# Patient Record
Sex: FEMALE | Race: WHITE | Employment: OTHER | URBAN - METROPOLITAN AREA
[De-identification: names, ages, dates, MRNs, and addresses within clinical notes are randomized per-mention and may not be internally consistent; named-entity substitution may affect disease eponyms.]

---

## 2017-10-10 PROBLEM — E78.00 HIGH CHOLESTEROL: Status: ACTIVE | Noted: 2017-10-10

## 2017-10-10 PROBLEM — E03.9 ACQUIRED HYPOTHYROIDISM: Status: ACTIVE | Noted: 2017-10-10

## 2017-10-10 PROBLEM — J44.9 CHRONIC OBSTRUCTIVE PULMONARY DISEASE, UNSPECIFIED COPD TYPE (HCC): Status: ACTIVE | Noted: 2017-10-10

## 2017-10-10 PROBLEM — I10 ESSENTIAL HYPERTENSION: Status: ACTIVE | Noted: 2017-10-10

## 2017-11-20 ENCOUNTER — LAB ENCOUNTER (OUTPATIENT)
Dept: LAB | Facility: HOSPITAL | Age: 67
End: 2017-11-20
Attending: INTERNAL MEDICINE
Payer: MEDICARE

## 2017-11-20 DIAGNOSIS — E03.9 ACQUIRED HYPOTHYROIDISM: ICD-10-CM

## 2017-11-20 DIAGNOSIS — I10 ESSENTIAL HYPERTENSION: ICD-10-CM

## 2017-11-20 DIAGNOSIS — E78.00 HIGH CHOLESTEROL: ICD-10-CM

## 2017-11-20 DIAGNOSIS — J44.9 CHRONIC OBSTRUCTIVE PULMONARY DISEASE, UNSPECIFIED COPD TYPE (HCC): ICD-10-CM

## 2017-11-20 PROCEDURE — 80076 HEPATIC FUNCTION PANEL: CPT

## 2017-11-20 PROCEDURE — 85025 COMPLETE CBC W/AUTO DIFF WBC: CPT

## 2017-11-20 PROCEDURE — 84443 ASSAY THYROID STIM HORMONE: CPT

## 2017-11-20 PROCEDURE — 36415 COLL VENOUS BLD VENIPUNCTURE: CPT

## 2017-11-20 PROCEDURE — 80061 LIPID PANEL: CPT

## 2017-11-20 PROCEDURE — 80069 RENAL FUNCTION PANEL: CPT

## 2018-06-13 ENCOUNTER — HOSPITAL ENCOUNTER (OUTPATIENT)
Dept: GENERAL RADIOLOGY | Age: 68
Discharge: HOME | End: 2018-06-13
Attending: ORTHOPAEDIC SURGERY
Payer: MEDICARE

## 2018-06-13 DIAGNOSIS — M25.552 LEFT HIP PAIN: ICD-10-CM

## 2018-06-13 PROCEDURE — 73502 X-RAY EXAM HIP UNI 2-3 VIEWS: CPT | Performed by: ORTHOPAEDIC SURGERY

## 2018-07-12 ENCOUNTER — HOSPITAL ENCOUNTER (OUTPATIENT)
Dept: GENERAL RADIOLOGY | Facility: HOSPITAL | Age: 68
Discharge: HOME | End: 2018-07-12
Attending: INTERNAL MEDICINE
Payer: MEDICARE

## 2018-07-12 ENCOUNTER — LAB ENCOUNTER (OUTPATIENT)
Dept: LAB | Facility: HOSPITAL | Age: 68
End: 2018-07-12
Attending: INTERNAL MEDICINE
Payer: MEDICARE

## 2018-07-12 DIAGNOSIS — J44.9 CHRONIC OBSTRUCTIVE PULMONARY DISEASE (COPD) (HCC): ICD-10-CM

## 2018-07-12 DIAGNOSIS — E78.00 HIGH CHOLESTEROL: ICD-10-CM

## 2018-07-12 DIAGNOSIS — J44.9 CHRONIC OBSTRUCTIVE PULMONARY DISEASE, UNSPECIFIED COPD TYPE (HCC): ICD-10-CM

## 2018-07-12 DIAGNOSIS — I10 ESSENTIAL HYPERTENSION: ICD-10-CM

## 2018-07-12 DIAGNOSIS — E03.9 ACQUIRED HYPOTHYROIDISM: ICD-10-CM

## 2018-07-12 LAB
ALBUMIN SERPL BCP-MCNC: 4.2 G/DL (ref 3.5–4.8)
ALBUMIN SERPL BCP-MCNC: 4.2 G/DL (ref 3.5–4.8)
ALP SERPL-CCNC: 54 U/L (ref 32–100)
ALT SERPL-CCNC: 23 U/L (ref 14–54)
ANION GAP SERPL CALC-SCNC: 10 MMOL/L (ref 0–18)
AST SERPL-CCNC: 24 U/L (ref 15–41)
BASOPHILS # BLD: 0 K/UL (ref 0–0.2)
BASOPHILS NFR BLD: 1 %
BILIRUB DIRECT SERPL-MCNC: 0.1 MG/DL (ref 0–0.2)
BILIRUB SERPL-MCNC: 1 MG/DL (ref 0.3–1.2)
BUN SERPL-MCNC: 18 MG/DL (ref 8–20)
BUN/CREAT SERPL: 19.1 (ref 10–20)
CALCIUM SERPL-MCNC: 9.5 MG/DL (ref 8.5–10.5)
CHLORIDE SERPL-SCNC: 101 MMOL/L (ref 95–110)
CHOLEST SERPL-MCNC: 190 MG/DL (ref 110–200)
CO2 SERPL-SCNC: 27 MMOL/L (ref 22–32)
CREAT SERPL-MCNC: 0.94 MG/DL (ref 0.5–1.5)
EOSINOPHIL # BLD: 0.1 K/UL (ref 0–0.7)
EOSINOPHIL NFR BLD: 1 %
ERYTHROCYTE [DISTWIDTH] IN BLOOD BY AUTOMATED COUNT: 13.2 % (ref 11–15)
GLUCOSE SERPL-MCNC: 101 MG/DL (ref 70–99)
HCT VFR BLD AUTO: 47 % (ref 35–48)
HDLC SERPL-MCNC: 48 MG/DL
HGB BLD-MCNC: 15.8 G/DL (ref 12–16)
LDLC SERPL CALC-MCNC: 114 MG/DL (ref 0–99)
LYMPHOCYTES # BLD: 1.3 K/UL (ref 1–4)
LYMPHOCYTES NFR BLD: 20 %
MCH RBC QN AUTO: 30.8 PG (ref 27–32)
MCHC RBC AUTO-ENTMCNC: 33.6 G/DL (ref 32–37)
MCV RBC AUTO: 91.6 FL (ref 80–100)
MONOCYTES # BLD: 0.4 K/UL (ref 0–1)
MONOCYTES NFR BLD: 7 %
NEUTROPHILS # BLD AUTO: 4.8 K/UL (ref 1.8–7.7)
NEUTROPHILS NFR BLD: 72 %
NONHDLC SERPL-MCNC: 142 MG/DL
OSMOLALITY UR CALC.SUM OF ELEC: 288 MOSM/KG (ref 275–295)
PHOSPHATE SERPL-MCNC: 3.4 MG/DL (ref 2.4–4.7)
PLATELET # BLD AUTO: 230 K/UL (ref 140–400)
PMV BLD AUTO: 8.3 FL (ref 7.4–10.3)
POTASSIUM SERPL-SCNC: 3.4 MMOL/L (ref 3.3–5.1)
PROT SERPL-MCNC: 7.8 G/DL (ref 5.9–8.4)
RBC # BLD AUTO: 5.13 M/UL (ref 3.7–5.4)
SODIUM SERPL-SCNC: 138 MMOL/L (ref 136–144)
TRIGL SERPL-MCNC: 138 MG/DL (ref 1–149)
TSH SERPL-ACNC: 0.47 UIU/ML (ref 0.45–5.33)
WBC # BLD AUTO: 6.6 K/UL (ref 4–11)

## 2018-07-12 PROCEDURE — 71046 X-RAY EXAM CHEST 2 VIEWS: CPT | Performed by: INTERNAL MEDICINE

## 2018-07-12 PROCEDURE — 80061 LIPID PANEL: CPT

## 2018-07-12 PROCEDURE — 84443 ASSAY THYROID STIM HORMONE: CPT

## 2018-07-12 PROCEDURE — 80076 HEPATIC FUNCTION PANEL: CPT

## 2018-07-12 PROCEDURE — 85025 COMPLETE CBC W/AUTO DIFF WBC: CPT

## 2018-07-12 PROCEDURE — 80069 RENAL FUNCTION PANEL: CPT

## 2018-07-12 PROCEDURE — 36415 COLL VENOUS BLD VENIPUNCTURE: CPT

## 2018-10-17 ENCOUNTER — APPOINTMENT (OUTPATIENT)
Dept: LAB | Facility: HOSPITAL | Age: 68
End: 2018-10-17
Attending: INTERNAL MEDICINE
Payer: MEDICARE

## 2018-10-17 DIAGNOSIS — E03.9 HYPOTHYROIDISM, UNSPECIFIED TYPE: ICD-10-CM

## 2018-10-17 PROCEDURE — 84443 ASSAY THYROID STIM HORMONE: CPT

## 2018-10-17 PROCEDURE — 36415 COLL VENOUS BLD VENIPUNCTURE: CPT

## 2019-02-14 ENCOUNTER — HOSPITAL ENCOUNTER (OUTPATIENT)
Dept: BONE DENSITY | Facility: HOSPITAL | Age: 69
Discharge: HOME | End: 2019-02-14
Attending: INTERNAL MEDICINE
Payer: MEDICARE

## 2019-02-14 DIAGNOSIS — Z13.820 SCREENING FOR OSTEOPOROSIS: ICD-10-CM

## 2019-02-14 PROCEDURE — 77080 DXA BONE DENSITY AXIAL: CPT | Performed by: INTERNAL MEDICINE

## 2019-02-27 PROBLEM — M54.12 CERVICAL RADICULOPATHY AT C6: Status: ACTIVE | Noted: 2019-02-27

## 2019-02-27 PROBLEM — F17.200 TOBACCO DEPENDENCE: Status: ACTIVE | Noted: 2019-02-27

## 2019-02-27 PROBLEM — M48.02 STENOSIS OF CERVICAL SPINE: Status: ACTIVE | Noted: 2019-02-27

## 2019-02-27 PROBLEM — R29.898 SHOULDER WEAKNESS: Status: ACTIVE | Noted: 2019-02-27

## 2019-02-27 PROBLEM — Z86.79 H/O MITRAL VALVE PROLAPSE: Status: ACTIVE | Noted: 2019-02-27

## 2019-02-27 PROBLEM — M54.50 CHRONIC MIDLINE LOW BACK PAIN WITHOUT SCIATICA: Status: ACTIVE | Noted: 2019-02-27

## 2019-02-27 PROBLEM — G89.29 CHRONIC MIDLINE LOW BACK PAIN WITHOUT SCIATICA: Status: ACTIVE | Noted: 2019-02-27

## 2019-03-21 PROBLEM — M19.012 PRIMARY OSTEOARTHRITIS OF LEFT SHOULDER: Status: ACTIVE | Noted: 2019-03-21

## 2019-05-10 ENCOUNTER — HOSPITAL ENCOUNTER (OUTPATIENT)
Dept: MAMMOGRAPHY | Facility: HOSPITAL | Age: 69
Discharge: HOME | End: 2019-05-10
Attending: INTERNAL MEDICINE
Payer: MEDICARE

## 2019-05-10 DIAGNOSIS — Z12.39 SCREENING FOR BREAST CANCER: ICD-10-CM

## 2019-05-10 PROCEDURE — 77067 SCR MAMMO BI INCL CAD: CPT | Performed by: INTERNAL MEDICINE

## 2019-05-10 PROCEDURE — 77063 BREAST TOMOSYNTHESIS BI: CPT | Performed by: INTERNAL MEDICINE

## 2019-05-14 ENCOUNTER — HOSPITAL ENCOUNTER (OUTPATIENT)
Dept: GENERAL RADIOLOGY | Facility: HOSPITAL | Age: 69
Discharge: HOME | End: 2019-05-14
Attending: INTERNAL MEDICINE
Payer: MEDICARE

## 2019-05-14 ENCOUNTER — LAB ENCOUNTER (OUTPATIENT)
Dept: LAB | Facility: HOSPITAL | Age: 69
End: 2019-05-14
Attending: INTERNAL MEDICINE
Payer: MEDICARE

## 2019-05-14 DIAGNOSIS — I10 ESSENTIAL HYPERTENSION: ICD-10-CM

## 2019-05-14 DIAGNOSIS — M54.50 PAIN OF LUMBAR SPINE: ICD-10-CM

## 2019-05-14 DIAGNOSIS — E78.00 HIGH CHOLESTEROL: ICD-10-CM

## 2019-05-14 DIAGNOSIS — R53.83 FATIGUE, UNSPECIFIED TYPE: ICD-10-CM

## 2019-05-14 DIAGNOSIS — E03.9 ACQUIRED HYPOTHYROIDISM: ICD-10-CM

## 2019-05-14 PROCEDURE — 85025 COMPLETE CBC W/AUTO DIFF WBC: CPT

## 2019-05-14 PROCEDURE — 72110 X-RAY EXAM L-2 SPINE 4/>VWS: CPT | Performed by: INTERNAL MEDICINE

## 2019-05-14 PROCEDURE — 36415 COLL VENOUS BLD VENIPUNCTURE: CPT

## 2019-05-14 PROCEDURE — 80069 RENAL FUNCTION PANEL: CPT

## 2019-05-14 PROCEDURE — 84443 ASSAY THYROID STIM HORMONE: CPT

## 2019-05-14 PROCEDURE — 80061 LIPID PANEL: CPT

## 2019-05-16 ENCOUNTER — HOSPITAL ENCOUNTER (OUTPATIENT)
Dept: MRI IMAGING | Facility: HOSPITAL | Age: 69
Discharge: HOME | End: 2019-05-16
Attending: INTERNAL MEDICINE
Payer: MEDICARE

## 2019-05-16 ENCOUNTER — APPOINTMENT (OUTPATIENT)
Dept: CV DIAGNOSTICS | Facility: HOSPITAL | Age: 69
End: 2019-05-16
Attending: INTERNAL MEDICINE
Payer: MEDICARE

## 2019-05-16 DIAGNOSIS — M48.02 STENOSIS OF CERVICAL SPINE: ICD-10-CM

## 2019-05-16 PROCEDURE — 72141 MRI NECK SPINE W/O DYE: CPT | Performed by: INTERNAL MEDICINE

## 2019-05-29 PROBLEM — I35.0 NONRHEUMATIC AORTIC VALVE STENOSIS: Status: ACTIVE | Noted: 2019-05-29

## 2019-05-29 PROBLEM — R06.09 DYSPNEA ON EXERTION: Status: ACTIVE | Noted: 2019-05-29

## 2019-05-29 PROBLEM — R06.00 DYSPNEA ON EXERTION: Status: ACTIVE | Noted: 2019-05-29

## 2019-07-11 ENCOUNTER — OFFICE VISIT (OUTPATIENT)
Dept: OPHTHALMOLOGY | Facility: CLINIC | Age: 69
End: 2019-07-11
Payer: MEDICARE

## 2019-07-11 ENCOUNTER — HOSPITAL ENCOUNTER (OUTPATIENT)
Dept: GENERAL RADIOLOGY | Facility: HOSPITAL | Age: 69
Discharge: HOME | End: 2019-07-11
Attending: INTERNAL MEDICINE | Admitting: OPHTHALMOLOGY
Payer: MEDICARE

## 2019-07-11 DIAGNOSIS — H52.13 HIGH MYOPIA, BILATERAL: ICD-10-CM

## 2019-07-11 DIAGNOSIS — H25.13 AGE-RELATED NUCLEAR CATARACT OF BOTH EYES: Primary | ICD-10-CM

## 2019-07-11 DIAGNOSIS — J44.9 COPD (CHRONIC OBSTRUCTIVE PULMONARY DISEASE) (HCC): ICD-10-CM

## 2019-07-11 PROCEDURE — 92004 COMPRE OPH EXAM NEW PT 1/>: CPT | Performed by: OPHTHALMOLOGY

## 2019-07-11 PROCEDURE — 92015 DETERMINE REFRACTIVE STATE: CPT | Performed by: OPHTHALMOLOGY

## 2019-07-11 PROCEDURE — 71046 X-RAY EXAM CHEST 2 VIEWS: CPT | Performed by: INTERNAL MEDICINE

## 2019-07-11 NOTE — ASSESSMENT & PLAN NOTE
Discussed moderate cataracts in both eyes that are not affecting vision and are not surgical at this time. New glasses today; suggest update.

## 2019-07-11 NOTE — PROGRESS NOTES
Joaquin Sanders is a 81year old female. HPI:     HPI     Pt is here for a complete exam. Pt complains of gradual decrease in vision for the past few years. Pt would like an updated glasses Rx today.  Pt was last seen at the [facility] in taking differently: takes one half tab daily) Disp: 90 tablet Rfl: 1       Allergies:    Sulfa Antibiotics       TONGUE SWELLING    Comment: Thrush    ROS:       PHYSICAL EXAM:     Base Eye Exam     Visual Acuity (Snellen - Linear)       Right Left    Dist are not affecting vision and are not surgical at this time. New glasses today; suggest update. High myopia, bilateral  New glasses today; recommend update. No orders of the defined types were placed in this encounter.       Meds This Visit:  Re

## 2019-07-11 NOTE — PATIENT INSTRUCTIONS
Age-related nuclear cataract of both eyes  Discussed moderate cataracts in both eyes that are not affecting vision and are not surgical at this time. New glasses today; suggest update. High myopia, bilateral  New glasses today; recommend update.

## 2019-08-29 PROBLEM — M48.061 SPINAL STENOSIS OF LUMBAR REGION WITHOUT NEUROGENIC CLAUDICATION: Status: ACTIVE | Noted: 2019-08-29

## 2019-11-26 PROBLEM — N39.44 NOCTURNAL ENURESIS: Status: ACTIVE | Noted: 2019-11-26

## 2019-11-26 PROBLEM — N89.8 VAGINAL IRRITATION: Status: ACTIVE | Noted: 2019-11-26

## 2020-02-27 PROBLEM — R01.1 HEART MURMUR, SYSTOLIC: Status: ACTIVE | Noted: 2020-02-27

## 2020-08-30 ENCOUNTER — LAB ENCOUNTER (OUTPATIENT)
Dept: LAB | Facility: HOSPITAL | Age: 70
End: 2020-08-30
Attending: INTERNAL MEDICINE
Payer: MEDICARE

## 2020-08-30 DIAGNOSIS — E78.00 HIGH CHOLESTEROL: ICD-10-CM

## 2020-08-30 DIAGNOSIS — Z13.0 SCREENING FOR DISORDER OF BLOOD AND BLOOD-FORMING ORGANS: ICD-10-CM

## 2020-08-30 DIAGNOSIS — Z13.29 ENCOUNTER FOR SCREENING FOR ENDOCRINE DISORDER: ICD-10-CM

## 2020-08-30 DIAGNOSIS — Z13.228 ENCOUNTER FOR SCREENING FOR METABOLIC DISORDER: ICD-10-CM

## 2020-08-30 LAB
ALBUMIN SERPL-MCNC: 3.9 G/DL (ref 3.4–5)
ALBUMIN/GLOB SERPL: 1 {RATIO} (ref 1–2)
ALP LIVER SERPL-CCNC: 71 U/L (ref 55–142)
ALT SERPL-CCNC: 26 U/L (ref 13–56)
ANION GAP SERPL CALC-SCNC: 5 MMOL/L (ref 0–18)
AST SERPL-CCNC: 21 U/L (ref 15–37)
BASOPHILS # BLD AUTO: 0.04 X10(3) UL (ref 0–0.2)
BASOPHILS NFR BLD AUTO: 0.6 %
BILIRUB SERPL-MCNC: 0.7 MG/DL (ref 0.1–2)
BUN BLD-MCNC: 15 MG/DL (ref 7–18)
BUN/CREAT SERPL: 14.6 (ref 10–20)
CALCIUM BLD-MCNC: 9.3 MG/DL (ref 8.5–10.1)
CHLORIDE SERPL-SCNC: 102 MMOL/L (ref 98–112)
CHOLEST SMN-MCNC: 205 MG/DL (ref ?–200)
CO2 SERPL-SCNC: 30 MMOL/L (ref 21–32)
CREAT BLD-MCNC: 1.03 MG/DL (ref 0.55–1.02)
DEPRECATED RDW RBC AUTO: 44.3 FL (ref 35.1–46.3)
EOSINOPHIL # BLD AUTO: 0.1 X10(3) UL (ref 0–0.7)
EOSINOPHIL NFR BLD AUTO: 1.6 %
ERYTHROCYTE [DISTWIDTH] IN BLOOD BY AUTOMATED COUNT: 12.9 % (ref 11–15)
GLOBULIN PLAS-MCNC: 4 G/DL (ref 2.8–4.4)
GLUCOSE BLD-MCNC: 120 MG/DL (ref 70–99)
HCT VFR BLD AUTO: 47.9 % (ref 35–48)
HDLC SERPL-MCNC: 47 MG/DL (ref 40–59)
HGB BLD-MCNC: 15.9 G/DL (ref 12–16)
IMM GRANULOCYTES # BLD AUTO: 0.02 X10(3) UL (ref 0–1)
IMM GRANULOCYTES NFR BLD: 0.3 %
LDLC SERPL CALC-MCNC: 109 MG/DL (ref ?–100)
LYMPHOCYTES # BLD AUTO: 1.32 X10(3) UL (ref 1–4)
LYMPHOCYTES NFR BLD AUTO: 21.1 %
M PROTEIN MFR SERPL ELPH: 7.9 G/DL (ref 6.4–8.2)
MCH RBC QN AUTO: 31.2 PG (ref 26–34)
MCHC RBC AUTO-ENTMCNC: 33.2 G/DL (ref 31–37)
MCV RBC AUTO: 93.9 FL (ref 80–100)
MONOCYTES # BLD AUTO: 0.47 X10(3) UL (ref 0.1–1)
MONOCYTES NFR BLD AUTO: 7.5 %
NEUTROPHILS # BLD AUTO: 4.3 X10 (3) UL (ref 1.5–7.7)
NEUTROPHILS # BLD AUTO: 4.3 X10(3) UL (ref 1.5–7.7)
NEUTROPHILS NFR BLD AUTO: 68.9 %
NONHDLC SERPL-MCNC: 158 MG/DL (ref ?–130)
OSMOLALITY SERPL CALC.SUM OF ELEC: 286 MOSM/KG (ref 275–295)
PATIENT FASTING Y/N/NP: NO
PATIENT FASTING Y/N/NP: NO
PLATELET # BLD AUTO: 221 10(3)UL (ref 150–450)
POTASSIUM SERPL-SCNC: 4.2 MMOL/L (ref 3.5–5.1)
RBC # BLD AUTO: 5.1 X10(6)UL (ref 3.8–5.3)
SODIUM SERPL-SCNC: 137 MMOL/L (ref 136–145)
T4 FREE SERPL-MCNC: 1.6 NG/DL (ref 0.8–1.7)
TRIGL SERPL-MCNC: 245 MG/DL (ref 30–149)
TSI SER-ACNC: 8.55 MIU/ML (ref 0.36–3.74)
VLDLC SERPL CALC-MCNC: 49 MG/DL (ref 0–30)
WBC # BLD AUTO: 6.3 X10(3) UL (ref 4–11)

## 2020-08-30 PROCEDURE — 80053 COMPREHEN METABOLIC PANEL: CPT

## 2020-08-30 PROCEDURE — 84439 ASSAY OF FREE THYROXINE: CPT

## 2020-08-30 PROCEDURE — 80061 LIPID PANEL: CPT

## 2020-08-30 PROCEDURE — 84443 ASSAY THYROID STIM HORMONE: CPT

## 2020-08-30 PROCEDURE — 36415 COLL VENOUS BLD VENIPUNCTURE: CPT

## 2020-08-30 PROCEDURE — 85025 COMPLETE CBC W/AUTO DIFF WBC: CPT

## 2021-01-16 ENCOUNTER — LAB ENCOUNTER (OUTPATIENT)
Dept: LAB | Facility: HOSPITAL | Age: 71
End: 2021-01-16
Attending: INTERNAL MEDICINE
Payer: MEDICARE

## 2021-01-16 DIAGNOSIS — R79.89 LOW VITAMIN D LEVEL: ICD-10-CM

## 2021-01-16 DIAGNOSIS — I10 ESSENTIAL HYPERTENSION: ICD-10-CM

## 2021-01-16 DIAGNOSIS — E78.00 HIGH CHOLESTEROL: ICD-10-CM

## 2021-01-16 LAB
ALBUMIN SERPL-MCNC: 4.2 G/DL (ref 3.4–5)
ANION GAP SERPL CALC-SCNC: 3 MMOL/L (ref 0–18)
BUN BLD-MCNC: 17 MG/DL (ref 7–18)
BUN/CREAT SERPL: 15.9 (ref 10–20)
CALCIUM BLD-MCNC: 9.9 MG/DL (ref 8.5–10.1)
CHLORIDE SERPL-SCNC: 103 MMOL/L (ref 98–112)
CHOLEST SMN-MCNC: 201 MG/DL (ref ?–200)
CO2 SERPL-SCNC: 31 MMOL/L (ref 21–32)
CREAT BLD-MCNC: 1.07 MG/DL
GLUCOSE BLD-MCNC: 105 MG/DL (ref 70–99)
HDLC SERPL-MCNC: 52 MG/DL (ref 40–59)
LDLC SERPL CALC-MCNC: 106 MG/DL (ref ?–100)
NONHDLC SERPL-MCNC: 149 MG/DL (ref ?–130)
OSMOLALITY SERPL CALC.SUM OF ELEC: 286 MOSM/KG (ref 275–295)
PATIENT FASTING Y/N/NP: YES
PHOSPHATE SERPL-MCNC: 2.7 MG/DL (ref 2.5–4.9)
POTASSIUM SERPL-SCNC: 3.9 MMOL/L (ref 3.5–5.1)
SODIUM SERPL-SCNC: 137 MMOL/L (ref 136–145)
TRIGL SERPL-MCNC: 214 MG/DL (ref 30–149)
TSI SER-ACNC: 0.44 MIU/ML (ref 0.36–3.74)
VLDLC SERPL CALC-MCNC: 43 MG/DL (ref 0–30)

## 2021-01-16 PROCEDURE — 84443 ASSAY THYROID STIM HORMONE: CPT

## 2021-01-16 PROCEDURE — 82306 VITAMIN D 25 HYDROXY: CPT

## 2021-01-16 PROCEDURE — 80061 LIPID PANEL: CPT

## 2021-01-16 PROCEDURE — 36415 COLL VENOUS BLD VENIPUNCTURE: CPT

## 2021-01-16 PROCEDURE — 80069 RENAL FUNCTION PANEL: CPT

## 2021-01-18 LAB — 25(OH)D3 SERPL-MCNC: 19.6 NG/ML (ref 30–100)

## 2021-02-01 DIAGNOSIS — Z23 NEED FOR VACCINATION: ICD-10-CM

## 2021-02-08 ENCOUNTER — IMMUNIZATION (OUTPATIENT)
Dept: LAB | Facility: HOSPITAL | Age: 71
End: 2021-02-08
Attending: HOSPITALIST
Payer: MEDICARE

## 2021-02-08 DIAGNOSIS — Z23 NEED FOR VACCINATION: Primary | ICD-10-CM

## 2021-02-08 PROCEDURE — 0011A SARSCOV2 VAC 100MCG/0.5ML IM: CPT

## 2021-03-09 ENCOUNTER — IMMUNIZATION (OUTPATIENT)
Dept: LAB | Facility: HOSPITAL | Age: 71
End: 2021-03-09
Attending: EMERGENCY MEDICINE
Payer: MEDICARE

## 2021-03-09 DIAGNOSIS — Z23 NEED FOR VACCINATION: Primary | ICD-10-CM

## 2021-03-09 PROCEDURE — 0012A SARSCOV2 VAC 100MCG/0.5ML IM: CPT

## 2022-05-01 ENCOUNTER — LAB ENCOUNTER (OUTPATIENT)
Dept: LAB | Facility: HOSPITAL | Age: 72
End: 2022-05-01
Attending: INTERNAL MEDICINE
Payer: MEDICARE

## 2022-05-01 DIAGNOSIS — I10 ESSENTIAL HYPERTENSION, MALIGNANT: ICD-10-CM

## 2022-05-01 DIAGNOSIS — I51.9 MYXEDEMA HEART DISEASE: Primary | ICD-10-CM

## 2022-05-01 DIAGNOSIS — R00.0 TACHYCARDIA, UNSPECIFIED: ICD-10-CM

## 2022-05-01 DIAGNOSIS — E03.9 MYXEDEMA HEART DISEASE: Primary | ICD-10-CM

## 2022-05-01 LAB
ANION GAP SERPL CALC-SCNC: 6 MMOL/L (ref 0–18)
BUN BLD-MCNC: 19 MG/DL (ref 7–18)
BUN/CREAT SERPL: 18.4 (ref 10–20)
CALCIUM BLD-MCNC: 9.5 MG/DL (ref 8.5–10.1)
CHLORIDE SERPL-SCNC: 103 MMOL/L (ref 98–112)
CHOLEST SERPL-MCNC: 193 MG/DL (ref ?–200)
CO2 SERPL-SCNC: 28 MMOL/L (ref 21–32)
CREAT BLD-MCNC: 1.03 MG/DL
FASTING PATIENT LIPID ANSWER: YES
FASTING STATUS PATIENT QL REPORTED: YES
GLUCOSE BLD-MCNC: 102 MG/DL (ref 70–99)
HDLC SERPL-MCNC: 44 MG/DL (ref 40–59)
LDLC SERPL CALC-MCNC: 105 MG/DL (ref ?–100)
NONHDLC SERPL-MCNC: 149 MG/DL (ref ?–130)
OSMOLALITY SERPL CALC.SUM OF ELEC: 286 MOSM/KG (ref 275–295)
POTASSIUM SERPL-SCNC: 4.2 MMOL/L (ref 3.5–5.1)
SODIUM SERPL-SCNC: 137 MMOL/L (ref 136–145)
T4 FREE SERPL-MCNC: 1.9 NG/DL (ref 0.8–1.7)
TRIGL SERPL-MCNC: 255 MG/DL (ref 30–149)
TSI SER-ACNC: 0.29 MIU/ML (ref 0.36–3.74)
VIT D+METAB SERPL-MCNC: 28.9 NG/ML (ref 30–100)
VLDLC SERPL CALC-MCNC: 44 MG/DL (ref 0–30)

## 2022-05-01 PROCEDURE — 80048 BASIC METABOLIC PNL TOTAL CA: CPT

## 2022-05-01 PROCEDURE — 84443 ASSAY THYROID STIM HORMONE: CPT

## 2022-05-01 PROCEDURE — 80061 LIPID PANEL: CPT

## 2022-05-01 PROCEDURE — 36415 COLL VENOUS BLD VENIPUNCTURE: CPT

## 2022-05-01 PROCEDURE — 84439 ASSAY OF FREE THYROXINE: CPT

## 2022-05-01 PROCEDURE — 82306 VITAMIN D 25 HYDROXY: CPT

## 2023-06-01 ENCOUNTER — OFFICE VISIT (OUTPATIENT)
Dept: OPHTHALMOLOGY | Facility: CLINIC | Age: 73
End: 2023-06-01

## 2023-06-01 DIAGNOSIS — H25.13 AGE-RELATED NUCLEAR CATARACT OF BOTH EYES: Primary | ICD-10-CM

## 2023-06-01 DIAGNOSIS — H35.363 DRUSEN OF MACULA, BILATERAL: ICD-10-CM

## 2023-06-01 PROCEDURE — 92004 COMPRE OPH EXAM NEW PT 1/>: CPT | Performed by: OPHTHALMOLOGY

## 2023-06-01 PROCEDURE — 92015 DETERMINE REFRACTIVE STATE: CPT | Performed by: OPHTHALMOLOGY

## 2023-06-01 NOTE — ASSESSMENT & PLAN NOTE
Discussed early cataracts with patient. Told patient that cataracts are age appropriate and they are not surgical at this time. No treatment recommended at this time. Updated new glasses Rx for better vision.

## 2023-06-01 NOTE — PATIENT INSTRUCTIONS
Age-related nuclear cataract of both eyes  Discussed early cataracts with patient. Told patient that cataracts are age appropriate and they are not surgical at this time. No treatment recommended at this time. Updated new glasses Rx for better vision. Drusen of macula, bilateral  Discussed early macular degeneration changes in both eyes with the patient. Stressed importance of taking either daily multi-vitamins or over the counter eye vitamins. Recommend Ocuvite, Preservision or I Caps over the counter for eye vitamins. Recommend that patient look for something that says AREDS 2 formula. Recommend eating a healthy diet, sunglasses for eye protection and no smoking to prevent progression of macular degeneration.

## 2023-06-01 NOTE — ASSESSMENT & PLAN NOTE
Discussed early macular degeneration changes in both eyes with the patient. Stressed importance of taking either daily multi-vitamins or over the counter eye vitamins. Recommend Ocuvite, Preservision or I Caps over the counter for eye vitamins. Recommend that patient look for something that says AREDS 2 formula. Recommend eating a healthy diet, sunglasses for eye protection and no smoking to prevent progression of macular degeneration.

## 2024-02-27 ENCOUNTER — TELEPHONE (OUTPATIENT)
Dept: OPHTHALMOLOGY | Facility: CLINIC | Age: 74
End: 2024-02-27

## 2024-02-27 NOTE — TELEPHONE ENCOUNTER
Called patient back.  She has a constant floater that looks like a prism which is interfering with vision.  She is very anxious.  Scheduled her for tomorrow. 2/28/24.    PERLA

## 2024-02-27 NOTE — TELEPHONE ENCOUNTER
Pt called stating pt is scheduled for annual eye exam on 6-4-24.   In the right eye pt is seeing a shadow and floating prism.  Started one week ago.  Pt requesting an appointment.  Please call

## 2024-02-28 ENCOUNTER — OFFICE VISIT (OUTPATIENT)
Dept: OPHTHALMOLOGY | Facility: CLINIC | Age: 74
End: 2024-02-28
Payer: MEDICARE

## 2024-02-28 DIAGNOSIS — H35.363 DRUSEN OF MACULA, BILATERAL: ICD-10-CM

## 2024-02-28 DIAGNOSIS — H25.13 AGE-RELATED NUCLEAR CATARACT OF BOTH EYES: Primary | ICD-10-CM

## 2024-02-28 PROBLEM — H43.391 VITREOUS FLOATERS OF RIGHT EYE: Status: ACTIVE | Noted: 2024-02-28

## 2024-02-28 PROCEDURE — 92014 COMPRE OPH EXAM EST PT 1/>: CPT | Performed by: OPHTHALMOLOGY

## 2024-02-28 NOTE — PROGRESS NOTES
Ayleen Keita is a 73 year old female.    HPI:     HPI    Pt is here for new floaters and thinks they're in the right eye but isn't sure.  Pt describes the floater as small and looks like a prism. It comes and goes.  Pt first noticed floater around Feb. 14th.  She has had floaters before and this one is different.  Pt does not notice floater right now.  She was anxious about it and we squeezed her in today. She also says that today she noticed that sharp edges that should look straight seem to have a dip in the right eye only.    Last edited by Lucy Casillas O.T. on 2/28/2024  3:33 PM.        Patient History:  Past Medical History:   Diagnosis Date    Age-related nuclear cataract of both eyes 7/11/2019    Anxiety     Arthritis     Depression     Essential hypertension     High myopia, bilateral 7/11/2019    Hyperlipidemia     Hypothyroid     Nonrheumatic aortic valve stenosis     Obesity        Surgical History: Ayleen Keita has a past surgical history that includes hip replacement surgery (10/13/2014) (left hip); hip surgery; and smitha localization wire 1 site right (cpt=19281) (benign).    Family History   Problem Relation Age of Onset    Cancer Father 75        pancreatic     Thyroid Disorder Mother     Heart Disease Mother         bypass and valve at age 84    Thyroid Disorder Brother     Diabetes Neg     Macular degeneration Neg     Glaucoma Neg        Social History:   Social History     Socioeconomic History    Marital status:    Tobacco Use    Smoking status: Every Day     Packs/day: 1.00     Years: 49.00     Additional pack years: 0.00     Total pack years: 49.00     Types: Cigarettes    Smokeless tobacco: Never   Vaping Use    Vaping Use: Never used   Substance and Sexual Activity    Alcohol use: No     Comment: social    Drug use: No       Medications:  Current Outpatient Medications   Medication Sig Dispense Refill    LEVOTHYROXINE 200 MCG Oral Tab TAKE 1 TABLET EVERY MORNING BEFORE  BREAKFAST 90 tablet 0    PRAVASTATIN 40 MG Oral Tab TAKE 1 TABLET EVERY NIGHT 90 tablet 3    VENTOLIN  (90 Base) MCG/ACT Inhalation Aero Soln Inhale 2 puffs into the lungs every 4 (four) hours as needed for Wheezing. Request if for brand name - system automatically defaults to generic. 3 each 1    triamterene-hydroCHLOROthiazide 37.5-25 MG Oral Cap Take 1 capsule by mouth every morning. 90 capsule 3    LEVOTHYROXINE 25 MCG Oral Tab TAKE 1 TABLET BEFORE BREAKFAST 90 tablet 0    Betamethasone Dipropionate Aug 0.05 % External Cream 1 application twice daily for 1-2 weeks then once daily for one week then as needed 45 g 3       Allergies:  Allergies   Allergen Reactions    Sulfa Antibiotics TONGUE SWELLING     Thrush       ROS:     ROS    Positive for: Eyes  Negative for: Constitutional, Gastrointestinal, Neurological, Skin, Genitourinary, Musculoskeletal, HENT, Endocrine, Cardiovascular, Respiratory, Psychiatric, Allergic/Imm, Heme/Lymph  Last edited by Lucy Casillas, O.T. on 2/28/2024  3:34 PM.          PHYSICAL EXAM:     Base Eye Exam       Visual Acuity (Snellen - Linear)         Right Left    Dist cc 20/100 -1 20/60 +2    Dist ph cc NI NI      Correction: Glasses              Tonometry (Icare, 3:39 PM)         Right Left    Pressure 17 15              Pupils         Pupils    Right PERRL    Left PERRL              Visual Fields         Left Right     Full Full              Extraocular Movement         Right Left     Full, Ortho Full, Ortho              Neuro/Psych       Oriented x3: Yes              Dilation       Both eyes: 1.0% Mydriacyl and 2.5% Lance Synephrine @ 3:39 PM                  Additional Tests       Amsler         Right Left     See drawing Normal                  Slit Lamp and Fundus Exam       Slit Lamp Exam         Right Left    Lids/Lashes Dermatochalasis, Meibomian gland dysfunction, Papilloma nasally RUL Dermatochalasis, Meibomian gland dysfunction    Conjunctiva/Sclera Normal Temp  pinguecula, conj cyst nasally    Cornea Clear Clear    Anterior Chamber Deep and quiet Deep and quiet    Iris Normal Normal    Lens 2-3+ Nuclear sclerosis, Trace Cortical cataract 2-3+ Nuclear sclerosis, Trace Cortical cataract    Vitreous no pigment no pigment              Fundus Exam         Right Left    Disc sloping inferior margin, Peripapillary atrophy Sloping margin, Temporal crescent    C/D Ratio 0.4 0.8    Macula few fine hard drusen few fine hard drusen    Vessels Normal Normal    Periphery Normal Normal                  Refraction       Wearing Rx         Sphere Cylinder Axis Add    Right -6.75 +1.50 120 +2.75    Left -6.75 +1.25 060 +2.75      Age: 9m    Type: Progressive bifocal              Manifest Refraction (Auto)         Sphere Cylinder Ocate Dist VA Add Near VA    Right          Left -6.75 +1.50 060 20/60- +3.00 20/50              Manifest Refraction #2 (Auto)         Sphere Cylinder Ocate Dist VA Add Near VA    Right -5.75 +1.50 110       Left -6.50 +1.25 070                 Manifest Refraction Comments    Unable to refract right eye, there is a circular prism obscuring her vision.  Left eye difficult to refract because letters seem to be moving around.                     ASSESSMENT/PLAN:     Diagnoses and Plan:     Age-related nuclear cataract of both eyes  Discussed early cataracts with patient. Told patient that cataracts are age appropriate and they are not surgical at this time.  No treatment recommended at this time.       Drusen of macula, bilateral  Discussed early macular degeneration changes in both eyes with the patient.  Stressed importance of taking either daily multi-vitamins or over the counter eye vitamins.      Recommend Ocuvite, Preservision or I Caps over the counter for eye vitamins.   Recommend that patient look for something that says AREDS 2 formula.      Recommend eating a healthy diet, sunglasses for eye protection and no smoking to prevent progression of macular  degeneration.    Refer to Dr. Antoni Bah for evaluation and treatment.  Appointment was scheduled on Tuesday March 12th at 1;40 pm with Dr. Bah.     Location is the [facility] location- [address] [place] [address], [address], [place] [address].  Phone # [phone] (orange parking lot)   Patient should bring photo ID, insurance cards, they should be aware that their eyes will be dilated and they should expect to be there for at least 2 hours.    The patient should bring a list of all medications.        No orders of the defined types were placed in this encounter.      Meds This Visit:  Requested Prescriptions      No prescriptions requested or ordered in this encounter        Follow up instructions:  Return in about 1 year (around 2/28/2025) for complete exam.    2/28/2024  Scribed by: Mikey Almanza MD

## 2024-02-28 NOTE — ASSESSMENT & PLAN NOTE
Discussed early macular degeneration changes in both eyes with the patient.  Stressed importance of taking either daily multi-vitamins or over the counter eye vitamins.      Recommend Ocuvite, Preservision or I Caps over the counter for eye vitamins.   Recommend that patient look for something that says AREDS 2 formula.      Recommend eating a healthy diet, sunglasses for eye protection and no smoking to prevent progression of macular degeneration.    Refer to Dr. Antoni Bah for evaluation and treatment.  Appointment was scheduled on Tuesday March 12th at 1;40 pm with Dr. Bah.     Location is the [facility] location- [address] [place] [address], [address], [place] [address].  Phone # [phone] (orange parking lot)   Patient should bring photo ID, insurance cards, they should be aware that their eyes will be dilated and they should expect to be there for at least 2 hours.    The patient should bring a list of all medications.

## 2024-02-28 NOTE — ASSESSMENT & PLAN NOTE
Discussed early cataracts with patient. Told patient that cataracts are age appropriate and they are not surgical at this time.  No treatment recommended at this time.

## 2024-02-28 NOTE — PATIENT INSTRUCTIONS
Age-related nuclear cataract of both eyes  Discussed early cataracts with patient. Told patient that cataracts are age appropriate and they are not surgical at this time.  No treatment recommended at this time.       Drusen of macula, bilateral  Discussed early macular degeneration changes in both eyes with the patient.  Stressed importance of taking either daily multi-vitamins or over the counter eye vitamins.      Recommend Ocuvite, Preservision or I Caps over the counter for eye vitamins.   Recommend that patient look for something that says AREDS 2 formula.      Recommend eating a healthy diet, sunglasses for eye protection and no smoking to prevent progression of macular degeneration.    Refer to Dr. Antoni Bah for evaluation and treatment.  Appointment was scheduled on Tuesday March 12th at 1;40 pm with Dr. Bah.     Location is the [facility] location- [address] [place] [address], [address], [place] [address].  Phone # [phone] (orange parking lot)   Patient should bring photo ID, insurance cards, they should be aware that their eyes will be dilated and they should expect to be there for at least 2 hours.    The patient should bring a list of all medications.

## 2024-03-29 NOTE — DISCHARGE INSTRUCTIONS
HOME INSTRUCTIONS    [facility] Post-Operative Care Instructions:  Leave patch in place until follow up appointment.  Positioning: FACE DOWN.  Mild pain or irritation is normal. Take Tylenol as needed for mild pain.  Call the office immediately with severe pain at [phone] at any time of day or night.  No driving until cleared by your surgeon.  Because gas was used, no air travel, SCUBA diving, or travel to high elevation until the bubble completely resolves (approximately 4-5 weeks).  Bring eye drops to appointment tomorrow.     [facility] HOME CARE INSTRUCTIONS: POST-OP ANESTHESIA  The medication that you received for sedation or general anesthesia can last up to 24 hours. Your judgment and reflexes may be altered, even if you feel like your normal self.      We Recommend:   Do not drive any motor vehicle or bicycle   Avoid mowing the lawn, playing sports, or working with power tools/applicances (power saws, electric knives or mixers)   That you have someone stay with you on your first night home   Do not drink alcohol or take sleeping pills or tranquilizers   Do not sign legal documents within 24 hours of your procedure   If you had a nerve block for your surgery, take extra care not to put any pressure on your arm or hand for 24 hours    It is normal:  For you to have a sore throat if you had a breathing tube during surgery (while you were asleep!). The sore throat should get better within 48 hours. You can gargle with warm salt water (1/2 tsp in 4 oz warm water) or use a throat lozenge for comfort  To feel muscle aches or soreness especially in the abdomen, chest or neck. The achy feeling should go away in the next 24 hours  To feel weak, sleepy or \"wiped out\". Your should start feeling better in the next 24 hours.   To experience mild discomforts such as sore lip or tongue, headache, cramps, gas pains or a bloated feeling in your abdomen.   To experience mild back pain or soreness for a day or two if you had  spinal or epidural anesthesia.   If you had laparoscopic surgery, to feel shoulder pain or discomfort on the day of surgery.   For some patients to have nausea after surgery/anesthesia    If you feel nausea or experience vomiting:   Try to move around less.   Eat less than usual or drink only liquids until the next morning   Nausea should resolve in about 24 hours    If you have a problem when you are at home:    Call your surgeons office   Discharge Instructions: After Your Surgery  You’ve just had surgery. During surgery, you were given medicine called anesthesia to keep you relaxed and free of pain. After surgery, you may have some pain or nausea. This is common. Here are some tips for feeling better and getting well after surgery.   Going home  Your healthcare provider will show you how to take care of yourself when you go home. They'll also answer your questions. Have an adult family member or friend drive you home. For the first 24 hours after your surgery:   Don't drive or use heavy equipment.  Don't make important decisions or sign legal papers.  Take medicines as directed.  Don't drink alcohol.  Have someone stay with you, if needed. They can watch for problems and help keep you safe.  Be sure to go to all follow-up visits with your healthcare provider. And rest after your surgery for as long as your provider tells you to.   Coping with pain  If you have pain after surgery, pain medicine will help you feel better. Take it as directed, before pain becomes severe. Also, ask your healthcare provider or pharmacist about other ways to control pain. This might be with heat, ice, or relaxation. And follow any other instructions your surgeon or nurse gives you.      Stay on schedule with your medicine.     Tips for taking pain medicine  To get the best relief possible, remember these points:   Pain medicines can upset your stomach. Taking them with a little food may help.  Most pain relievers taken by mouth need at  least 20 to 30 minutes to start to work.  Don't wait till your pain becomes severe before you take your medicine. Try to time your medicine so that you can take it before starting an activity. This might be before you get dressed, go for a walk, or sit down for dinner.  Constipation is a common side effect of some pain medicines. Call your healthcare provider before taking any medicines such as laxatives or stool softeners to help ease constipation. Also ask if you should skip any foods. Drinking lots of fluids and eating foods such as fruits and vegetables that are high in fiber can also help. Remember, don't take laxatives unless your surgeon has prescribed them.  Drinking alcohol and taking pain medicine can cause dizziness and slow your breathing. It can even be deadly. Don't drink alcohol while taking pain medicine.  Pain medicine can make you react more slowly to things. Don't drive or run machinery while taking pain medicine.  Your healthcare provider may tell you to take acetaminophen to help ease your pain. Ask them how much you're supposed to take each day. Acetaminophen or other pain relievers may interact with your prescription medicines or other over-the-counter (OTC) medicines. Some prescription medicines have acetaminophen and other ingredients in them. Using both prescription and OTC acetaminophen for pain can cause you to accidentally overdose. Read the labels on your OTC medicines with care. This will help you to clearly know the list of ingredients, how much to take, and any warnings. It may also help you not take too much acetaminophen. If you have questions or don't understand the information, ask your pharmacist or healthcare provider to explain it to you before you take the OTC medicine.   Managing nausea  Some people have an upset stomach (nausea) after surgery. This is often because of anesthesia, pain, or pain medicine, less movement of food in the stomach, or the stress of surgery. These  tips will help you handle nausea and eat healthy foods as you get better. If you were on a special food plan before surgery, ask your healthcare provider if you should follow it while you get better. Check with your provider on how your eating should progress. It may depend on the surgery you had. These general tips may help:   Don't push yourself to eat. Your body will tell you when to eat and how much.  Start off with clear liquids and soup. They're easier to digest.  Next try semi-solid foods as you feel ready. These include mashed potatoes, applesauce, and gelatin.  Slowly move to solid foods. Don’t eat fatty, rich, or spicy foods at first.  Don't force yourself to have 3 large meals a day. Instead eat smaller amounts more often.  Take pain medicines with a small amount of solid food, such as crackers or toast. This helps prevent nausea.  When to call your healthcare provider  Call your healthcare provider right away if you have any of these:   You still have too much pain, or the pain gets worse, after taking the medicine. The medicine may not be strong enough. Or there may be a complication from the surgery.  You feel too sleepy, dizzy, or groggy. The medicine may be too strong.  Side effects such as nausea or vomiting. Your healthcare provider may advise taking other medicines to .  Skin changes such as rash, itching, or hives. This may mean you have an allergic reaction. Your provider may advise taking other medicines.  The incision looks different (for instance, part of it opens up).  Bleeding or fluid leaking from the incision site, and weren't told to expect that.  Fever of 100.4°F (38°C) or higher, or as directed by your provider.  Call 911  Call 911 right away if you have:   Trouble breathing  Facial swelling    If you have obstructive sleep apnea   You were given anesthesia medicine during surgery to keep you comfortable and free of pain. After surgery, you may have more apnea spells because of this  medicine and other medicines you were given. The spells may last longer than normal.    At home:  Keep using the continuous positive airway pressure (CPAP) device when you sleep. Unless your healthcare provider tells you not to, use it when you sleep, day or night. CPAP is a common device used to treat obstructive sleep apnea.  Talk with your provider before taking any pain medicine, muscle relaxants, or sedatives. Your provider will tell you about the possible dangers of taking these medicines.  Contact your provider if your sleeping changes a lot even when taking medicines as directed.  Alex last reviewed this educational content on 10/1/2021  © 0626-4477 [employer]. All rights reserved. This information is not intended as a substitute for professional medical care. Always follow your healthcare professional's instructions.

## 2024-04-01 ENCOUNTER — ANESTHESIA EVENT (OUTPATIENT)
Dept: SURGERY | Facility: HOSPITAL | Age: 74
End: 2024-04-01
Payer: MEDICARE

## 2024-04-01 ENCOUNTER — ANESTHESIA (OUTPATIENT)
Dept: SURGERY | Facility: HOSPITAL | Age: 74
End: 2024-04-01
Payer: MEDICARE

## 2024-04-01 ENCOUNTER — HOSPITAL ENCOUNTER (OUTPATIENT)
Facility: HOSPITAL | Age: 74
Setting detail: HOSPITAL OUTPATIENT SURGERY
Discharge: HOME | End: 2024-04-01
Attending: OPHTHALMOLOGY | Admitting: OPHTHALMOLOGY
Payer: MEDICARE

## 2024-04-01 VITALS
HEART RATE: 90 BPM | BODY MASS INDEX: 33.74 KG/M2 | RESPIRATION RATE: 18 BRPM | TEMPERATURE: 98 F | SYSTOLIC BLOOD PRESSURE: 173 MMHG | OXYGEN SATURATION: 95 % | DIASTOLIC BLOOD PRESSURE: 83 MMHG | WEIGHT: 215 LBS | HEIGHT: 67 IN

## 2024-04-01 PROCEDURE — 08N43ZZ RELEASE RIGHT VITREOUS, PERCUTANEOUS APPROACH: ICD-10-PCS | Performed by: OPHTHALMOLOGY

## 2024-04-01 PROCEDURE — 08QE3ZZ REPAIR RIGHT RETINA, PERCUTANEOUS APPROACH: ICD-10-PCS | Performed by: OPHTHALMOLOGY

## 2024-04-01 RX ORDER — ACETAMINOPHEN AND CODEINE PHOSPHATE 300; 30 MG/1; MG/1
2 TABLET ORAL ONCE AS NEEDED
Status: DISCONTINUED | OUTPATIENT
Start: 2024-04-01 | End: 2024-04-01

## 2024-04-01 RX ORDER — SODIUM CHLORIDE, SODIUM LACTATE, POTASSIUM CHLORIDE, CALCIUM CHLORIDE 600; 310; 30; 20 MG/100ML; MG/100ML; MG/100ML; MG/100ML
INJECTION, SOLUTION INTRAVENOUS CONTINUOUS
Status: DISCONTINUED | OUTPATIENT
Start: 2024-04-01 | End: 2024-04-01

## 2024-04-01 RX ORDER — METOCLOPRAMIDE HYDROCHLORIDE 5 MG/ML
10 INJECTION INTRAMUSCULAR; INTRAVENOUS EVERY 8 HOURS PRN
Status: DISCONTINUED | OUTPATIENT
Start: 2024-04-01 | End: 2024-04-01

## 2024-04-01 RX ORDER — LIDOCAINE HYDROCHLORIDE 10 MG/ML
INJECTION, SOLUTION EPIDURAL; INFILTRATION; INTRACAUDAL; PERINEURAL AS NEEDED
Status: DISCONTINUED | OUTPATIENT
Start: 2024-04-01 | End: 2024-04-01 | Stop reason: SURG

## 2024-04-01 RX ORDER — FAMOTIDINE 20 MG/1
20 TABLET, FILM COATED ORAL ONCE
Status: COMPLETED | OUTPATIENT
Start: 2024-04-01 | End: 2024-04-01

## 2024-04-01 RX ORDER — ATROPINE SULFATE 10 MG/ML
SOLUTION/ DROPS OPHTHALMIC AS NEEDED
Status: DISCONTINUED | OUTPATIENT
Start: 2024-04-01 | End: 2024-04-01 | Stop reason: HOSPADM

## 2024-04-01 RX ORDER — PHENYLEPHRINE HYDROCHLORIDE 25 MG/ML
2 SOLUTION/ DROPS OPHTHALMIC
Status: COMPLETED | OUTPATIENT
Start: 2024-04-01 | End: 2024-04-01

## 2024-04-01 RX ORDER — ACETAMINOPHEN 325 MG/1
650 TABLET ORAL EVERY 4 HOURS PRN
Status: DISCONTINUED | OUTPATIENT
Start: 2024-04-01 | End: 2024-04-01

## 2024-04-01 RX ORDER — HYDROCODONE BITARTRATE AND ACETAMINOPHEN 5; 325 MG/1; MG/1
1 TABLET ORAL EVERY 4 HOURS PRN
Status: DISCONTINUED | OUTPATIENT
Start: 2024-04-01 | End: 2024-04-01

## 2024-04-01 RX ORDER — FAMOTIDINE 10 MG/ML
20 INJECTION, SOLUTION INTRAVENOUS ONCE
Status: COMPLETED | OUTPATIENT
Start: 2024-04-01 | End: 2024-04-01

## 2024-04-01 RX ORDER — ACETAMINOPHEN 500 MG
1000 TABLET ORAL ONCE
Status: COMPLETED | OUTPATIENT
Start: 2024-04-01 | End: 2024-04-01

## 2024-04-01 RX ORDER — MIDAZOLAM HYDROCHLORIDE 1 MG/ML
INJECTION INTRAMUSCULAR; INTRAVENOUS AS NEEDED
Status: DISCONTINUED | OUTPATIENT
Start: 2024-04-01 | End: 2024-04-01 | Stop reason: SURG

## 2024-04-01 RX ORDER — GENTAMICIN SULFATE 3 MG/ML
2 SOLUTION/ DROPS OPHTHALMIC
Status: COMPLETED | OUTPATIENT
Start: 2024-04-01 | End: 2024-04-01

## 2024-04-01 RX ORDER — ONDANSETRON 2 MG/ML
4 INJECTION INTRAMUSCULAR; INTRAVENOUS EVERY 6 HOURS PRN
Status: DISCONTINUED | OUTPATIENT
Start: 2024-04-01 | End: 2024-04-01

## 2024-04-01 RX ORDER — BALANCED SALT SOLUTION 6.4; .75; .48; .3; 3.9; 1.7 MG/ML; MG/ML; MG/ML; MG/ML; MG/ML; MG/ML
SOLUTION OPHTHALMIC AS NEEDED
Status: DISCONTINUED | OUTPATIENT
Start: 2024-04-01 | End: 2024-04-01 | Stop reason: HOSPADM

## 2024-04-01 RX ORDER — METOCLOPRAMIDE 10 MG/1
10 TABLET ORAL ONCE
Status: COMPLETED | OUTPATIENT
Start: 2024-04-01 | End: 2024-04-01

## 2024-04-01 RX ORDER — ATROPINE SULFATE 10 MG/ML
1 SOLUTION/ DROPS OPHTHALMIC
Status: COMPLETED | OUTPATIENT
Start: 2024-04-01 | End: 2024-04-01

## 2024-04-01 RX ORDER — INDOCYANINE GREEN AND WATER 25 MG
KIT INJECTION AS NEEDED
Status: DISCONTINUED | OUTPATIENT
Start: 2024-04-01 | End: 2024-04-01 | Stop reason: HOSPADM

## 2024-04-01 RX ORDER — HYDROCODONE BITARTRATE AND ACETAMINOPHEN 5; 325 MG/1; MG/1
2 TABLET ORAL EVERY 4 HOURS PRN
Status: DISCONTINUED | OUTPATIENT
Start: 2024-04-01 | End: 2024-04-01

## 2024-04-01 RX ORDER — NALOXONE HYDROCHLORIDE 0.4 MG/ML
0.08 INJECTION, SOLUTION INTRAMUSCULAR; INTRAVENOUS; SUBCUTANEOUS AS NEEDED
Status: DISCONTINUED | OUTPATIENT
Start: 2024-04-01 | End: 2024-04-01

## 2024-04-01 RX ORDER — ACETAMINOPHEN 500 MG
1000 TABLET ORAL ONCE AS NEEDED
Status: DISCONTINUED | OUTPATIENT
Start: 2024-04-01 | End: 2024-04-01

## 2024-04-01 RX ORDER — ACETAMINOPHEN AND CODEINE PHOSPHATE 300; 30 MG/1; MG/1
1 TABLET ORAL ONCE AS NEEDED
Status: DISCONTINUED | OUTPATIENT
Start: 2024-04-01 | End: 2024-04-01

## 2024-04-01 RX ORDER — METOCLOPRAMIDE HYDROCHLORIDE 5 MG/ML
10 INJECTION INTRAMUSCULAR; INTRAVENOUS ONCE
Status: COMPLETED | OUTPATIENT
Start: 2024-04-01 | End: 2024-04-01

## 2024-04-01 RX ADMIN — SODIUM CHLORIDE, SODIUM LACTATE, POTASSIUM CHLORIDE, CALCIUM CHLORIDE: 600; 310; 30; 20 INJECTION, SOLUTION INTRAVENOUS at 11:43:00

## 2024-04-01 RX ADMIN — MIDAZOLAM HYDROCHLORIDE 1 MG: 1 INJECTION INTRAMUSCULAR; INTRAVENOUS at 11:50:00

## 2024-04-01 RX ADMIN — LIDOCAINE HYDROCHLORIDE 50 MG: 10 INJECTION, SOLUTION EPIDURAL; INFILTRATION; INTRACAUDAL; PERINEURAL at 11:48:00

## 2024-04-01 RX ADMIN — MIDAZOLAM HYDROCHLORIDE 1 MG: 1 INJECTION INTRAMUSCULAR; INTRAVENOUS at 11:45:00

## 2024-04-01 NOTE — ANESTHESIA PREPROCEDURE EVALUATION
Anesthesia PreOp Note    HPI:     Ayleen Keita is a 73 year old female who presents for preoperative consultation requested by: Antoni Bah MD    Date of Surgery: 4/1/2024    Procedure(s):  Pars plana vitrectomy with membrane peeling laser, gas right eye  Indication: Macular hole, right eye    Relevant Problems   No relevant active problems       NPO:  Last Liquid Consumption Date: 03/31/24  Last Liquid Consumption Time: 2100  Last Solid Consumption Date: 03/31/24  Last Solid Consumption Time: 2100  Last Liquid Consumption Date: 03/31/24          History Review:  Patient Active Problem List    Diagnosis Date Noted    Drusen of macula, bilateral 06/01/2023    Heart murmur, systolic 02/27/2020    Vaginal irritation 11/26/2019    Nocturnal enuresis 11/26/2019    Spinal stenosis of lumbar region without neurogenic claudication 08/29/2019    Age-related nuclear cataract of both eyes 07/11/2019    High myopia, bilateral 07/11/2019    Nonrheumatic aortic valve stenosis 05/29/2019    Dyspnea on exertion 05/29/2019    Primary osteoarthritis of left shoulder 03/21/2019    Chronic midline low back pain without sciatica 02/27/2019    H/O mitral valve prolapse 02/27/2019    Stenosis of cervical spine 02/27/2019    Cervical radiculopathy at C6 02/27/2019    Tobacco dependence 02/27/2019    Shoulder weakness 02/27/2019    Chronic obstructive pulmonary disease, unspecified COPD type (HCC) 10/10/2017    Acquired hypothyroidism 10/10/2017    High cholesterol 10/10/2017    Essential hypertension 10/10/2017       Past Medical History:   Diagnosis Date    Age-related nuclear cataract of both eyes 07/11/2019    Anxiety     Arthritis     COPD (chronic obstructive pulmonary disease) (HCC)     Depression     Essential hypertension     High myopia, bilateral 07/11/2019    Hyperlipidemia     Hypothyroid     Incontinence     Nonrheumatic aortic valve stenosis     Obesity     Visual impairment     Glasses       Past Surgical  History:   Procedure Laterality Date    HIP REPLACEMENT SURGERY  10/13/2014    left hip    HIP SURGERY      RASTA LOCALIZATION WIRE 1 SITE RIGHT (CPT=19281)      benign       Medications Prior to Admission   Medication Sig Dispense Refill Last Dose    LEVOTHYROXINE 200 MCG Oral Tab TAKE 1 TABLET EVERY MORNING BEFORE BREAKFAST 90 tablet 0 4/1/2024 at 0700    PRAVASTATIN 40 MG Oral Tab TAKE 1 TABLET EVERY NIGHT 90 tablet 3 3/31/2024 at 2100    VENTOLIN  (90 Base) MCG/ACT Inhalation Aero Soln Inhale 2 puffs into the lungs every 4 (four) hours as needed for Wheezing. Request if for brand name - system automatically defaults to generic. 3 each 1 4/1/2024 at 0600    triamterene-hydroCHLOROthiazide 37.5-25 MG Oral Cap Take 1 capsule by mouth every morning. 90 capsule 3 3/31/2024 at 0800     Current Facility-Administered Medications Ordered in Epic   Medication Dose Route Frequency Provider Last Rate Last Admin    lactated ringers infusion   Intravenous Continuous Antoni Bah MD 20 mL/hr at 04/01/24 0938 New Bag at 04/01/24 0938    atropine (Atropine-Care) 1 % ophthalmic solution 1 drop  1 drop Right Eye Q5 Min Antoni Bah MD        phenylephrine (Mydfrin) 2.5 % ophthalmic solution 2 drop  2 drop Right Eye Q5 Min Antoni Bah MD        gentamicin (Garamycin) 0.3 % ophthalmic solution 2 drop  2 drop Right Eye Q5 Min Antoni Bah MD         No current [facility]-ordered outpatient medications on file.       Allergies   Allergen Reactions    Sulfa Antibiotics TONGUE SWELLING     Thrush    Nickel RASH     Costume jewelry       Family History   Problem Relation Age of Onset    Cancer Father 75        pancreatic     Thyroid Disorder Mother     Heart Disease Mother         bypass and valve at age 84    Thyroid Disorder Brother     Diabetes Neg     Macular degeneration Neg     Glaucoma Neg      Social History     Socioeconomic History    Marital status:    Tobacco Use    Smoking status: Every  Day     Packs/day: 1.00     Years: 49.00     Additional pack years: 0.00     Total pack years: 49.00     Types: Cigarettes    Smokeless tobacco: Never   Vaping Use    Vaping Use: Never used   Substance and Sexual Activity    Alcohol use: Not Currently     Comment: social    Drug use: No       Available pre-op labs reviewed.             Vital Signs:  Body mass index is 33.67 kg/m².   height is 1.702 m (5' 7\") and weight is 97.5 kg (215 lb). Her oral temperature is 99.1 °F (37.3 °C). Her pulse is 100. Her respiration is 20 and oxygen saturation is 94%.   Vitals:    03/28/24 1233 04/01/24 0935   Pulse:  100   Resp:  20   Temp:  99.1 °F (37.3 °C)   TempSrc:  Oral   SpO2:  94%   Weight: 97.5 kg (215 lb) 97.5 kg (215 lb)   Height: 1.702 m (5' 7\") 1.702 m (5' 7\")        Anesthesia Evaluation     Patient summary reviewed    No history of anesthetic complications   Airway   Mallampati: II  TM distance: >3 FB  Neck ROM: full  Dental    (+) partials        Pulmonary - normal exam   (+) COPD, shortness of breath  (-) asthma, recent URI    ROS comment: Tobacco smoking   Cardiovascular   Exercise tolerance: good  (+) hypertension, valvular problems/murmurs AS and MVP, dysrhythmias  (-) angina    ECG reviewed  Rhythm: regular  Rate: normal  ROS comment:   ECHO (01/24/2023)  ----------------------  Conclusions    Summary:    1. Left ventricle: The cavity size is normal. Wall thickness is normal.    Systolic function is normal. The estimated ejection fraction is 55-60%.    Wall motion is normal; there are no regional wall motion abnormalities.  2. Pericardium, extracardiac: There is no significant pericardial effusion.    Impressions:  No previous study was available for comparison.    Prepared and signed by  Harris De Guzman MD, [facility], JOSE ALEJANDRO  01/24/2023 18:37               Neuro/Psych    (+)  neuromuscular disease, anxiety/panic attacks,  depression    (-) seizures, CVA    GI/Hepatic/Renal - negative ROS   (+) chronic renal disease  (-)  hepatitis, liver disease    Endo/Other    (+) hypothyroidism, arthritis  Abdominal                  Anesthesia Plan:   ASA:  3  Plan:   MAC  Post-op Pain Management: IV analgesics and Oral pain medication  Informed Consent Plan and Risks Discussed With:  Patient and spouse  Discussed plan with:  Surgeon      I have informed Ayleen Keita and/or legal guardian or family member of the nature of the anesthetic plan, benefits, risks including possible dental damage if relevant, major complications, and any alternative forms of anesthetic management.   All of the patient's questions were answered to the best of my ability. The patient desires the anesthetic management as planned.  Deborah Villegas DO  4/1/2024 9:42 AM  Present on Admission:  **None**

## 2024-04-01 NOTE — BRIEF OP NOTE
Pre-Operative Diagnosis: Macular hole, right eye     Post-Operative Diagnosis: Macular hole, right eye      Procedure Performed:   Pars plana vitrectomy, stripping of internal limiting membrane, endolaser photocoagulation, fluid-air exchange, and infusion of 22% SF6 gas, right eye    Surgeon(s) and Role:     * Antoni Bah MD - Primary    Assistant(s):   None     Surgical Findings: macular hole, lattice degeneration with atrophic holes at 7oclock and lattice degeneration without breaks at 11oclock     Specimen: none     Estimated Blood Loss: Negligible (<1cc)      Antoni Bah MD  4/1/2024  12:39 PM

## 2024-04-01 NOTE — ANESTHESIA POSTPROCEDURE EVALUATION
Patient: Ayleen Keita    Procedure Summary       Date: 04/01/24 Room / Location: [facility] MAIN OR 03 / [facility] MAIN OR    Anesthesia Start: 1143 Anesthesia Stop: 1242    Procedure: Pars plana vitrectomy with membrane peeling laser, SF6 gas right eye (Right: Eye) Diagnosis: (Macular hole, right eye)    Surgeons: Antoni Bah MD Anesthesiologist: Deborah Villegas DO    Anesthesia Type: MAC ASA Status: 3            Anesthesia Type: MAC    Vitals Value Taken Time   /109 04/01/24 1241   Temp 97.4 04/01/24 1247   Pulse 88 04/01/24 1244   Resp 13 04/01/24 1246   SpO2 100 % 04/01/24 1244   Vitals shown include unfiled device data.    [facility] AN Post Evaluation:   Patient Evaluated in PACU  Patient Participation: complete - patient participated  Level of Consciousness: awake  Pain Score: 0  Pain Management: adequate  Airway Patency:patent  Dental exam unchanged from preop  Yes    Cardiovascular Status: hemodynamically stable  Respiratory Status: spontaneous ventilation, unassisted, room air and nonlabored ventilation  Postoperative Hydration stable      Deborah Villegas DO  4/1/2024 12:47 PM

## 2024-04-01 NOTE — OPERATIVE REPORT
[facility]  part of [facility]    Operative Note         Ayleen Keita Location: OR   [facility] [id number] MRN [id number]   Admission Date 4/1/2024 Operation Date 4/1/2024   Attending Physician No att. providers found       Patient Name: Ayleen Keita     Preoperative Diagnosis: Macular hole, right eye     Postoperative Diagnosis: Macular hole, right eye    Procedure(s):  Pars plana vitrectomy, stripping of internal limiting membrane, endolaser photocoagulation, fluid-air exchange, and infusion of 22% SF6 gas, right eye      Primary Surgeon: Antoni Bah MD     Assistant: None     Anesthesia: MAC with retrobulbar block     Specimen: None     Estimated Blood Loss: Negligible (less than 1 cc)   Complications: none     Indications for procedure: Patient is a 73-year-old woman with blurry vision in both eyes, found to have a macular hole of both eyes. Recommended surgery for right eye first given worse vision in the right eye as well as presence of lattice degeneration in the right eye. Risks include cataract, pain, bleeding, infection, inflammation, glaucoma (elevated intraocular pressure), optic neuropathy, refractive changes, need for further surgeries, need for postoperative positioning, temporary and permanent vision loss up to and including blindness, and loss of eye.  Alternative includes pars plana vitrectomy without membrane peeling or without gas, which have lower success rates. Risks of anesthesia which are not related to the surgery include stroke, heart attack, and death.  Patient expressed understanding of all risks and wished to proceed with surgery as recommended.     Surgical Findings: macular hole, lattice degeneration with atrophic holes at 7oclock and lattice degeneration without breaks at 11oclock      Operative Summary:       Patient was identified in the preop waiting room, and the right eye was marked.  Patient was brought to the operating room where anesthesia was  initiated by the anesthesia personnel.  A retrobulbar block of 50%/50% mixture of 0.75% Marcaine and 2% lidocaine without epinephrine was delivered to the right orbit, and 5 cc of the block was administered without complication.  The patient was prepped with Betadine and draped in the usual fashion for ophthalmic surgery.     Three 25G vitrectomy trochars were introduced into the superotemporal, superonasal, and inferotemporal quadrants at 4.0mm posterior to the limbus. The infusion cannula was secured to the inferotemporal port, its tip confirmed to be in the vitreous cavity, and the infusion was turned on. The posterior pole was visualized using the BIOM wide-angle viewing system.    A macular hole was noted, as well as lattice degeneration inferotemporally and superiorly. Core vitrectomy was performed. The posterior hyaloid was lifted using the cutter on suction to the vitreous base without complication. The vitreous was shaved 360 at the vitreous base, especially over the lattice degeneration. A couple small atrophic holes were identified within the inferotemporal patch of lattice. Dilute indocyanine green (ICG) dye was infused into the vitreous cavity, allowed to remain for 90 seconds, and removed completely. A macular flat lens was placed. The flex loop and ILM forceps were used to create a superior ILM hinge flap to the superior edge of the hole, and the rest of the macular ILM was removed from arcade to arcade. The peeled flap was removed using the vitrectomy cutter. Endolaser photocoagulation was used to apply 4 rows of laser around each patch of lattice degeneration. Scleral depression was performed 360 degrees and no other holes or tears were noted. A complete fluid-air exchange was performed using a soft-tip cannula while tilting the eye inferiorly, and the flap was noted to fold over the hole nicely. Pure SF6 gas was drawn up and diluted with filtered room air to 22% by the surgeon. The gas was infused  through the infusion line while venting with a forcep through the superonasal port. The ports were removed and found to be self sealing after a cotton tipped applicator was placed over each sclerotomy for approximately 10 seconds. The final intraocular pressure was approximately 15 mmHg to palpation.     Atropine drops and Tobradex ointment were placed in the eye, and a patch and shield was placed over the. The patient was turned over to anesthesia in stable condition.  The patient was instructed to position face down, to leave the patch in place, to come to the clinic appointment tomorrow, to take Tylenol for mild pain, and to call the office immediately for severe pain.     Implants: 22% SF6 gas     Drains: None      Condition: Stable      Antoni Bah MD

## 2024-04-01 NOTE — H&P
H&P from 3/21/24 by Rosa Rose reviewed. Proceed with PPV OD as planned for macular holes of the right eye.

## 2024-05-03 NOTE — DISCHARGE INSTRUCTIONS
HOME INSTRUCTIONS    [facility]ina Post-Operative Care Instructions:  Leave patch in place until follow up appointment.  Positioning: FACE DOWN.  Mild pain or irritation is normal. Take Tylenol as needed for mild pain.  Call the office immediately with severe pain at [phone] at any time of day or night.  No driving until cleared by your surgeon.  Because gas was used, no air travel, SCUBA diving, or travel to high elevation until the bubble completely resolves (approximately 1 month)      [facility] HOME CARE INSTRUCTIONS: POST-OP ANESTHESIA  The medication that you received for sedation or general anesthesia can last up to 24 hours. Your judgment and reflexes may be altered, even if you feel like your normal self.      We Recommend:   Do not drive any motor vehicle or bicycle   Avoid mowing the lawn, playing sports, or working with power tools/applicances (power saws, electric knives or mixers)   That you have someone stay with you on your first night home   Do not drink alcohol or take sleeping pills or tranquilizers   Do not sign legal documents within 24 hours of your procedure   If you had a nerve block for your surgery, take extra care not to put any pressure on your arm or hand for 24 hours    It is normal:  For you to have a sore throat if you had a breathing tube during surgery (while you were asleep!). The sore throat should get better within 48 hours. You can gargle with warm salt water (1/2 tsp in 4 oz warm water) or use a throat lozenge for comfort  To feel muscle aches or soreness especially in the abdomen, chest or neck. The achy feeling should go away in the next 24 hours  To feel weak, sleepy or \"wiped out\". Your should start feeling better in the next 24 hours.   To experience mild discomforts such as sore lip or tongue, headache, cramps, gas pains or a bloated feeling in your abdomen.   To experience mild back pain or soreness for a day or two if you had spinal or epidural anesthesia.   If you had  laparoscopic surgery, to feel shoulder pain or discomfort on the day of surgery.   For some patients to have nausea after surgery/anesthesia    If you feel nausea or experience vomiting:   Try to move around less.   Eat less than usual or drink only liquids until the next morning   Nausea should resolve in about 24 hours    If you have a problem when you are at home:    Call your surgeons office     Discharge Instructions: After Your Surgery  You’ve just had surgery. During surgery, you were given medicine called anesthesia to keep you relaxed and free of pain. After surgery, you may have some pain or nausea. This is common. Here are some tips for feeling better and getting well after surgery.   Going home  Your healthcare provider will show you how to take care of yourself when you go home. They'll also answer your questions. Have an adult family member or friend drive you home. For the first 24 hours after your surgery:   Don't drive or use heavy equipment.  Don't make important decisions or sign legal papers.  Take medicines as directed.  Don't drink alcohol.  Have someone stay with you, if needed. They can watch for problems and help keep you safe.  Be sure to go to all follow-up visits with your healthcare provider. And rest after your surgery for as long as your provider tells you to.   Coping with pain  If you have pain after surgery, pain medicine will help you feel better. Take it as directed, before pain becomes severe. Also, ask your healthcare provider or pharmacist about other ways to control pain. This might be with heat, ice, or relaxation. And follow any other instructions your surgeon or nurse gives you.      Stay on schedule with your medicine.     Tips for taking pain medicine  To get the best relief possible, remember these points:   Pain medicines can upset your stomach. Taking them with a little food may help.  Most pain relievers taken by mouth need at least 20 to 30 minutes to start to  work.  Don't wait till your pain becomes severe before you take your medicine. Try to time your medicine so that you can take it before starting an activity. This might be before you get dressed, go for a walk, or sit down for dinner.  Constipation is a common side effect of some pain medicines. Call your healthcare provider before taking any medicines such as laxatives or stool softeners to help ease constipation. Also ask if you should skip any foods. Drinking lots of fluids and eating foods such as fruits and vegetables that are high in fiber can also help. Remember, don't take laxatives unless your surgeon has prescribed them.  Drinking alcohol and taking pain medicine can cause dizziness and slow your breathing. It can even be deadly. Don't drink alcohol while taking pain medicine.  Pain medicine can make you react more slowly to things. Don't drive or run machinery while taking pain medicine.  Your healthcare provider may tell you to take acetaminophen to help ease your pain. Ask them how much you're supposed to take each day. Acetaminophen or other pain relievers may interact with your prescription medicines or other over-the-counter (OTC) medicines. Some prescription medicines have acetaminophen and other ingredients in them. Using both prescription and OTC acetaminophen for pain can cause you to accidentally overdose. Read the labels on your OTC medicines with care. This will help you to clearly know the list of ingredients, how much to take, and any warnings. It may also help you not take too much acetaminophen. If you have questions or don't understand the information, ask your pharmacist or healthcare provider to explain it to you before you take the OTC medicine.   Managing nausea  Some people have an upset stomach (nausea) after surgery. This is often because of anesthesia, pain, or pain medicine, less movement of food in the stomach, or the stress of surgery. These tips will help you handle nausea and  eat healthy foods as you get better. If you were on a special food plan before surgery, ask your healthcare provider if you should follow it while you get better. Check with your provider on how your eating should progress. It may depend on the surgery you had. These general tips may help:   Don't push yourself to eat. Your body will tell you when to eat and how much.  Start off with clear liquids and soup. They're easier to digest.  Next try semi-solid foods as you feel ready. These include mashed potatoes, applesauce, and gelatin.  Slowly move to solid foods. Don’t eat fatty, rich, or spicy foods at first.  Don't force yourself to have 3 large meals a day. Instead eat smaller amounts more often.  Take pain medicines with a small amount of solid food, such as crackers or toast. This helps prevent nausea.  When to call your healthcare provider  Call your healthcare provider right away if you have any of these:   You still have too much pain, or the pain gets worse, after taking the medicine. The medicine may not be strong enough. Or there may be a complication from the surgery.  You feel too sleepy, dizzy, or groggy. The medicine may be too strong.  Side effects such as nausea or vomiting. Your healthcare provider may advise taking other medicines to .  Skin changes such as rash, itching, or hives. This may mean you have an allergic reaction. Your provider may advise taking other medicines.  The incision looks different (for instance, part of it opens up).  Bleeding or fluid leaking from the incision site, and weren't told to expect that.  Fever of 100.4°F (38°C) or higher, or as directed by your provider.  Call 911  Call 911 right away if you have:   Trouble breathing  Facial swelling    If you have obstructive sleep apnea   You were given anesthesia medicine during surgery to keep you comfortable and free of pain. After surgery, you may have more apnea spells because of this medicine and other medicines you were  given. The spells may last longer than normal.    At home:  Keep using the continuous positive airway pressure (CPAP) device when you sleep. Unless your healthcare provider tells you not to, use it when you sleep, day or night. CPAP is a common device used to treat obstructive sleep apnea.  Talk with your provider before taking any pain medicine, muscle relaxants, or sedatives. Your provider will tell you about the possible dangers of taking these medicines.  Contact your provider if your sleeping changes a lot even when taking medicines as directed.  [employer] last reviewed this educational content on 10/1/2021  © 4768-6383 [employer]. All rights reserved. This information is not intended as a substitute for professional medical care. Always follow your healthcare professional's instructions.

## 2024-05-06 ENCOUNTER — HOSPITAL ENCOUNTER (OUTPATIENT)
Facility: HOSPITAL | Age: 74
Setting detail: HOSPITAL OUTPATIENT SURGERY
Discharge: HOME | End: 2024-05-06
Attending: OPHTHALMOLOGY | Admitting: OPHTHALMOLOGY
Payer: MEDICARE

## 2024-05-06 ENCOUNTER — ANESTHESIA EVENT (OUTPATIENT)
Dept: SURGERY | Facility: HOSPITAL | Age: 74
End: 2024-05-06
Payer: MEDICARE

## 2024-05-06 ENCOUNTER — ANESTHESIA (OUTPATIENT)
Dept: SURGERY | Facility: HOSPITAL | Age: 74
End: 2024-05-06
Payer: MEDICARE

## 2024-05-06 VITALS
SYSTOLIC BLOOD PRESSURE: 157 MMHG | HEART RATE: 90 BPM | OXYGEN SATURATION: 93 % | DIASTOLIC BLOOD PRESSURE: 78 MMHG | WEIGHT: 215 LBS | TEMPERATURE: 98 F | BODY MASS INDEX: 34.55 KG/M2 | RESPIRATION RATE: 18 BRPM | HEIGHT: 66 IN

## 2024-05-06 PROBLEM — H43.12 VITREOUS HEMORRHAGE, LEFT EYE (HCC): Status: ACTIVE | Noted: 2024-05-06

## 2024-05-06 PROCEDURE — 08T53ZZ RESECTION OF LEFT VITREOUS, PERCUTANEOUS APPROACH: ICD-10-PCS | Performed by: OPHTHALMOLOGY

## 2024-05-06 PROCEDURE — 08QF3ZZ REPAIR LEFT RETINA, PERCUTANEOUS APPROACH: ICD-10-PCS | Performed by: OPHTHALMOLOGY

## 2024-05-06 RX ORDER — FAMOTIDINE 10 MG/ML
20 INJECTION, SOLUTION INTRAVENOUS ONCE
Status: COMPLETED | OUTPATIENT
Start: 2024-05-06 | End: 2024-05-06

## 2024-05-06 RX ORDER — METOCLOPRAMIDE 10 MG/1
10 TABLET ORAL ONCE
Status: COMPLETED | OUTPATIENT
Start: 2024-05-06 | End: 2024-05-06

## 2024-05-06 RX ORDER — HYDROCODONE BITARTRATE AND ACETAMINOPHEN 5; 325 MG/1; MG/1
1 TABLET ORAL EVERY 4 HOURS PRN
Status: CANCELLED | OUTPATIENT
Start: 2024-05-06

## 2024-05-06 RX ORDER — HYDROMORPHONE HYDROCHLORIDE 1 MG/ML
0.4 INJECTION, SOLUTION INTRAMUSCULAR; INTRAVENOUS; SUBCUTANEOUS EVERY 5 MIN PRN
OUTPATIENT
Start: 2024-05-06

## 2024-05-06 RX ORDER — SODIUM CHLORIDE, SODIUM LACTATE, POTASSIUM CHLORIDE, CALCIUM CHLORIDE 600; 310; 30; 20 MG/100ML; MG/100ML; MG/100ML; MG/100ML
INJECTION, SOLUTION INTRAVENOUS CONTINUOUS
OUTPATIENT
Start: 2024-05-06

## 2024-05-06 RX ORDER — HYDROMORPHONE HYDROCHLORIDE 1 MG/ML
0.2 INJECTION, SOLUTION INTRAMUSCULAR; INTRAVENOUS; SUBCUTANEOUS EVERY 5 MIN PRN
OUTPATIENT
Start: 2024-05-06

## 2024-05-06 RX ORDER — BALANCED SALT SOLUTION 6.4; .75; .48; .3; 3.9; 1.7 MG/ML; MG/ML; MG/ML; MG/ML; MG/ML; MG/ML
SOLUTION OPHTHALMIC AS NEEDED
Status: DISCONTINUED | OUTPATIENT
Start: 2024-05-06 | End: 2024-05-06 | Stop reason: HOSPADM

## 2024-05-06 RX ORDER — HYDROMORPHONE HYDROCHLORIDE 1 MG/ML
0.6 INJECTION, SOLUTION INTRAMUSCULAR; INTRAVENOUS; SUBCUTANEOUS EVERY 5 MIN PRN
OUTPATIENT
Start: 2024-05-06

## 2024-05-06 RX ORDER — ATROPINE SULFATE 10 MG/ML
1 SOLUTION/ DROPS OPHTHALMIC SEE ADMIN INSTRUCTIONS
Status: COMPLETED | OUTPATIENT
Start: 2024-05-06 | End: 2024-05-06

## 2024-05-06 RX ORDER — ATROPINE SULFATE 10 MG/ML
SOLUTION/ DROPS OPHTHALMIC AS NEEDED
Status: DISCONTINUED | OUTPATIENT
Start: 2024-05-06 | End: 2024-05-06 | Stop reason: HOSPADM

## 2024-05-06 RX ORDER — MORPHINE SULFATE 4 MG/ML
4 INJECTION, SOLUTION INTRAMUSCULAR; INTRAVENOUS EVERY 10 MIN PRN
OUTPATIENT
Start: 2024-05-06

## 2024-05-06 RX ORDER — MORPHINE SULFATE 10 MG/ML
6 INJECTION, SOLUTION INTRAMUSCULAR; INTRAVENOUS EVERY 10 MIN PRN
OUTPATIENT
Start: 2024-05-06

## 2024-05-06 RX ORDER — PHENYLEPHRINE HYDROCHLORIDE 25 MG/ML
2 SOLUTION/ DROPS OPHTHALMIC SEE ADMIN INSTRUCTIONS
Status: COMPLETED | OUTPATIENT
Start: 2024-05-06 | End: 2024-05-06

## 2024-05-06 RX ORDER — INDOCYANINE GREEN AND WATER 25 MG
KIT INJECTION AS NEEDED
Status: DISCONTINUED | OUTPATIENT
Start: 2024-05-06 | End: 2024-05-06 | Stop reason: HOSPADM

## 2024-05-06 RX ORDER — ACETAMINOPHEN 500 MG
1000 TABLET ORAL ONCE
Status: COMPLETED | OUTPATIENT
Start: 2024-05-06 | End: 2024-05-06

## 2024-05-06 RX ORDER — MIDAZOLAM HYDROCHLORIDE 1 MG/ML
INJECTION INTRAMUSCULAR; INTRAVENOUS AS NEEDED
Status: DISCONTINUED | OUTPATIENT
Start: 2024-05-06 | End: 2024-05-06 | Stop reason: SURG

## 2024-05-06 RX ORDER — NALOXONE HYDROCHLORIDE 0.4 MG/ML
0.08 INJECTION, SOLUTION INTRAMUSCULAR; INTRAVENOUS; SUBCUTANEOUS AS NEEDED
OUTPATIENT
Start: 2024-05-06 | End: 2024-05-06

## 2024-05-06 RX ORDER — SODIUM CHLORIDE, SODIUM LACTATE, POTASSIUM CHLORIDE, CALCIUM CHLORIDE 600; 310; 30; 20 MG/100ML; MG/100ML; MG/100ML; MG/100ML
INJECTION, SOLUTION INTRAVENOUS CONTINUOUS
Status: DISCONTINUED | OUTPATIENT
Start: 2024-05-06 | End: 2024-05-06

## 2024-05-06 RX ORDER — DIPHENHYDRAMINE HYDROCHLORIDE 50 MG/ML
INJECTION INTRAMUSCULAR; INTRAVENOUS AS NEEDED
Status: DISCONTINUED | OUTPATIENT
Start: 2024-05-06 | End: 2024-05-06 | Stop reason: SURG

## 2024-05-06 RX ORDER — GENTAMICIN SULFATE 3 MG/ML
2 SOLUTION/ DROPS OPHTHALMIC SEE ADMIN INSTRUCTIONS
Status: COMPLETED | OUTPATIENT
Start: 2024-05-06 | End: 2024-05-06

## 2024-05-06 RX ORDER — FAMOTIDINE 20 MG/1
20 TABLET, FILM COATED ORAL ONCE
Status: COMPLETED | OUTPATIENT
Start: 2024-05-06 | End: 2024-05-06

## 2024-05-06 RX ORDER — HYDROCODONE BITARTRATE AND ACETAMINOPHEN 5; 325 MG/1; MG/1
2 TABLET ORAL EVERY 4 HOURS PRN
Status: CANCELLED | OUTPATIENT
Start: 2024-05-06

## 2024-05-06 RX ORDER — SODIUM CHLORIDE 9 MG/ML
INJECTION, SOLUTION INTRAVENOUS CONTINUOUS PRN
Status: DISCONTINUED | OUTPATIENT
Start: 2024-05-06 | End: 2024-05-06

## 2024-05-06 RX ORDER — METOCLOPRAMIDE HYDROCHLORIDE 5 MG/ML
10 INJECTION INTRAMUSCULAR; INTRAVENOUS ONCE
Status: COMPLETED | OUTPATIENT
Start: 2024-05-06 | End: 2024-05-06

## 2024-05-06 RX ORDER — ACETAMINOPHEN 325 MG/1
650 TABLET ORAL EVERY 4 HOURS PRN
Status: CANCELLED | OUTPATIENT
Start: 2024-05-06

## 2024-05-06 RX ORDER — MORPHINE SULFATE 2 MG/ML
2 INJECTION, SOLUTION INTRAMUSCULAR; INTRAVENOUS EVERY 10 MIN PRN
OUTPATIENT
Start: 2024-05-06

## 2024-05-06 RX ADMIN — MIDAZOLAM HYDROCHLORIDE 1 MG: 1 INJECTION INTRAMUSCULAR; INTRAVENOUS at 13:13:00

## 2024-05-06 RX ADMIN — MIDAZOLAM HYDROCHLORIDE 1 MG: 1 INJECTION INTRAMUSCULAR; INTRAVENOUS at 13:21:00

## 2024-05-06 RX ADMIN — DIPHENHYDRAMINE HYDROCHLORIDE 25 MG: 50 INJECTION INTRAMUSCULAR; INTRAVENOUS at 13:15:00

## 2024-05-06 RX ADMIN — SODIUM CHLORIDE, SODIUM LACTATE, POTASSIUM CHLORIDE, CALCIUM CHLORIDE: 600; 310; 30; 20 INJECTION, SOLUTION INTRAVENOUS at 13:11:00

## 2024-05-06 RX ADMIN — SODIUM CHLORIDE, SODIUM LACTATE, POTASSIUM CHLORIDE, CALCIUM CHLORIDE: 600; 310; 30; 20 INJECTION, SOLUTION INTRAVENOUS at 13:12:00

## 2024-05-06 NOTE — ANESTHESIA PREPROCEDURE EVALUATION
Anesthesia PreOp Note    HPI:     Ayleen Keita is a 73 year old female who presents for preoperative consultation requested by: Antoni Bah MD    Date of Surgery: 5/6/2024    Procedure(s):  Pars plana vitrectomy with membrane peeling laser, gas left eye  Indication: Macular hole left eye    Relevant Problems   No relevant active problems       NPO:  Last Liquid Consumption Date: 05/06/24  Last Liquid Consumption Time: 0730  Last Solid Consumption Date: 05/05/24  Last Solid Consumption Time: 2100  Last Liquid Consumption Date: 05/06/24          History Review:  Patient Active Problem List    Diagnosis Date Noted    Drusen of macula, bilateral 06/01/2023    Heart murmur, systolic 02/27/2020    Vaginal irritation 11/26/2019    Nocturnal enuresis 11/26/2019    Spinal stenosis of lumbar region without neurogenic claudication 08/29/2019    Age-related nuclear cataract of both eyes 07/11/2019    High myopia, bilateral 07/11/2019    Nonrheumatic aortic valve stenosis 05/29/2019    Dyspnea on exertion 05/29/2019    Primary osteoarthritis of left shoulder 03/21/2019    Chronic midline low back pain without sciatica 02/27/2019    H/O mitral valve prolapse 02/27/2019    Stenosis of cervical spine 02/27/2019    Cervical radiculopathy at C6 02/27/2019    Tobacco dependence 02/27/2019    Shoulder weakness 02/27/2019    Chronic obstructive pulmonary disease, unspecified COPD type ([facility]) 10/10/2017    Acquired hypothyroidism 10/10/2017    High cholesterol 10/10/2017    Essential hypertension 10/10/2017       Past Medical History:    Age-related nuclear cataract of both eyes    Anxiety    Arthritis    COPD (chronic obstructive pulmonary disease) ([facility])    Depression    Disorder of thyroid    Essential hypertension    High blood pressure    High cholesterol    High myopia, bilateral    Hyperlipidemia    Hypothyroid    Incontinence    Nonrheumatic aortic valve stenosis    Obesity    Visual impairment    Glasses       Past  Surgical History:   Procedure Laterality Date    Hip replacement surgery  10/13/2014    left hip    Hip surgery      Jacqueline localization wire 1 site right (cpt=19281)      benign       Medications Prior to Admission   Medication Sig Dispense Refill Last Dose    LEVOTHYROXINE 200 MCG Oral Tab TAKE 1 TABLET EVERY MORNING BEFORE BREAKFAST 90 tablet 0 5/6/2024    PRAVASTATIN 40 MG Oral Tab TAKE 1 TABLET EVERY NIGHT 90 tablet 3 5/5/2024    VENTOLIN  (90 Base) MCG/ACT Inhalation Aero Soln Inhale 2 puffs into the lungs every 4 (four) hours as needed for Wheezing. Request if for brand name - system automatically defaults to generic. 3 each 1 5/5/2024    triamterene-hydroCHLOROthiazide 37.5-25 MG Oral Cap Take 1 capsule by mouth every morning. 90 capsule 3 5/5/2024     Current Facility-Administered Medications Ordered in Epic   Medication Dose Route Frequency Provider Last Rate Last Admin    lactated ringers infusion   Intravenous Continuous Antoni Bah MD 20 mL/hr at 05/06/24 1133 New Bag at 05/06/24 1133     No current [facility]-ordered outpatient medications on file.       Allergies   Allergen Reactions    Sulfa Antibiotics TONGUE SWELLING     Thrush    Nickel RASH     Costume jewelry       Family History   Problem Relation Age of Onset    Cancer Father 75        pancreatic     Thyroid Disorder Mother     Heart Disease Mother         bypass and valve at age 84    Thyroid Disorder Brother     Diabetes Neg     Macular degeneration Neg     Glaucoma Neg      Social History     Socioeconomic History    Marital status:    Tobacco Use    Smoking status: Every Day     Current packs/day: 1.00     Average packs/day: 1 pack/day for 49.0 years (49.0 ttl pk-yrs)     Types: Cigarettes    Smokeless tobacco: Never   Vaping Use    Vaping status: Never Used   Substance and Sexual Activity    Alcohol use: Yes     Comment: Rarely    Drug use: No       Available pre-op labs reviewed.             Vital Signs:  Body mass index is  34.7 kg/m².   height is 1.676 m (5' 6\") and weight is 97.5 kg (215 lb). Her oral temperature is 98 °F (36.7 °C). Her blood pressure is 133/70 and her pulse is 89. Her respiration is 16 and oxygen saturation is 93%.   Vitals:    05/01/24 1455 05/06/24 1133   BP:  133/70   Pulse:  89   Resp:  16   Temp:  98 °F (36.7 °C)   TempSrc:  Oral   SpO2:  93%   Weight: 98.9 kg (218 lb) 97.5 kg (215 lb)   Height: 1.676 m (5' 6\") 1.676 m (5' 6\")        Anesthesia Evaluation     Patient summary reviewed    Airway   Mallampati: III  Dental - Dentition appears grossly intact     Pulmonary    (+) COPD, shortness of breath, rhonchi  Cardiovascular   (+) hypertension    Rhythm: regular  Rate: normal    Neuro/Psych    (+)  neuromuscular disease, anxiety/panic attacks,  depression      GI/Hepatic/Renal      Endo/Other    Abdominal  - normal exam                 Anesthesia Plan:   ASA:  3  Plan:   MAC  Informed Consent Plan and Risks Discussed With:  Patient      I have informed Ayleen Keita and/or legal guardian or family member of the nature of the anesthetic plan, benefits, risks including possible dental damage if relevant, major complications, and any alternative forms of anesthetic management.   All of the patient's questions were answered to the best of my ability. The patient desires the anesthetic management as planned.  Daphne Bernabe MD, PhD  5/6/2024 1:09 PM  Present on Admission:  **None**

## 2024-05-06 NOTE — OPERATIVE REPORT
[facility]  part of [facility]    Operative Note         Ayleen Keita Location: OR   [facility] [id number] MRN [id number]   Admission Date 5/6/2024 Operation Date 5/6/2024   Attending Physician Antoni Bah MD       Patient Name: Ayleen Keita     Preoperative Diagnosis: Macular hole left eye     Postoperative Diagnosis: Macular hole left eye     Procedure(s):  Pars plana vitrectomy, membrane peeling, endolaser photocoagulation, fluid-air exchange, infusion of 18% SF6 gas, left eye     Primary Surgeon: Antoni Bah MD      Anesthesia: MAC with retrobulbar block    Specimen: None    Estimated Blood Loss: Negligible (<1cc)  Complications: none    Indications for procedure: Macular hole, blurry vision     Surgical Findings: macular hole, lattice degeneration    Indications for procedure: Patient is a 73-year-old woman with blurry vision in both eyes, found to have a macular hole of both eyes, now s/p repair OD. Recommended surgery for left eye. Risks include cataract, pain, bleeding, infection, inflammation, glaucoma (elevated intraocular pressure), optic neuropathy, refractive changes, need for further surgeries, need for postoperative positioning, temporary and permanent vision loss up to and including blindness, and loss of eye.  Alternative includes pars plana vitrectomy without membrane peeling or without gas, which have lower success rates. Risks of anesthesia which are not related to the surgery include stroke, heart attack, and death. Patient expressed understanding of all risks and wished to proceed with surgery as recommended.    Surgical Findings: macular hole, lattice degeneration with a retinal tear at 1oclock.    Operative Summary:      Patient was identified in the preop waiting room, and the left eye was marked.  Patient was brought to the operating room where anesthesia was initiated by the anesthesia personnel.  A retrobulbar block of 50%/50% mixture of 0.75%  Marcaine and 2% lidocaine without epinephrine was delivered to the left orbit, and 5 cc of the block was administered without complication.  The patient was prepped with Betadine and draped in the usual fashion for ophthalmic surgery.     Three 25G vitrectomy trochars were introduced into the superotemporal, superonasal, and inferotemporal quadrants at 4.0mm posterior to the limbus. The infusion cannula was secured to the inferotemporal port, its tip confirmed to be in the vitreous cavity, and the infusion was turned on. The posterior pole was visualized using the BIOM wide-angle viewing system.     A macular hole was noted, as well as lattice degeneration at 1oclock superiorly. Core vitrectomy was performed. The posterior hyaloid was lifted using the cutter on suction to the vitreous base without complication. The vitreous was shaved 360 at the vitreous base, especially over the lattice degeneration. Dilute indocyanine green (ICG) dye was infused into the vitreous cavity, allowed to remain for 60 seconds, and removed completely. A macular flat lens was placed. The flex loop and ILM forceps were used to remove the internal limiting membrane from arcade to arcade. The peeled flap was removed using the vitrectomy cutter. Endolaser photocoagulation was used to apply 4 rows of laser around the lattice degeneration at 1oclock as well as around some peripheral pigmentary changes at 7oclock. Scleral depression was performed 360 degrees and no other holes or tears were noted. A complete fluid-air exchange was performed. Pure SF6 gas was drawn up and diluted with filtered room air to 18% by the surgeon. The gas was infused through the infusion line while venting with a forcep through the superonasal port. The ports were removed and found to be self sealing after a cotton tipped applicator was placed over each sclerotomy for approximately 10 seconds. The final intraocular pressure was approximately 15 mmHg to palpation.      Atropine drops and Tobradex ointment were placed in the eye, and a patch and shield was placed over the. The patient was turned over to anesthesia in stable condition.  The patient was instructed to position face down, to leave the patch in place, to come to the clinic appointment tomorrow, to take Tylenol for mild pain, and to call the office immediately for severe pain.     Implants: 18% SF6 gas     Drains: None      Condition: Stable      Antoni Bah MD

## 2024-05-06 NOTE — BRIEF OP NOTE
Pre-Operative Diagnosis: Macular hole left eye    Post-Operative Diagnosis: Macular hole left eye     Procedure Performed:   Pars plana vitrectomy, membrane peeling, endolaser photocoagulation, fluid-air exchange, infusion of 18% SF6 gas, left eye    Surgeons and Role:     * Antoni Bah MD - Primary    Assistant(s):   None     Surgical Findings: None     Specimen: None     Estimated Blood Loss: Negligible (<1cc)    Antoni Bah MD  5/6/2024  2:10 PM

## 2024-05-06 NOTE — H&P
H&P by Helen Wilkins on 4/29/24 reviewed. No interval changes. Proceed with pars plana vitrectomy of the left eye as planned.

## 2024-05-06 NOTE — ANESTHESIA POSTPROCEDURE EVALUATION
Patient: Ayleen Keita    Procedure Summary       Date: 05/06/24 Room / Location: [facility] MAIN OR 03 / [facility] MAIN OR    Anesthesia Start: 1312 Anesthesia Stop: 1411    Procedure: Pars plana vitrectomy with membrane peeling laser, sf6 gas left eye (Left: Eye) Diagnosis: (Macular hole left eye)    Surgeons: Antoni Bah MD Anesthesiologist: Daphne Bernabe MD, PhD    Anesthesia Type: MAC ASA Status: 3            Anesthesia Type: MAC    Vitals Value Taken Time   /83 05/06/24 1411   Temp 97.3 05/06/24 1411   Pulse 93 05/06/24 1411   Resp 20 05/06/24 1411   SpO2 96 % 05/06/24 1411   Vitals shown include unfiled device data.    [facility] AN Post Evaluation:   Patient Evaluated in PACU  Patient Participation: complete - patient participated  Level of Consciousness: awake and alert  Pain Score: 2  Pain Management: adequate  Airway Patency:patent  Yes    Nausea/Vomiting: none  Cardiovascular Status: acceptable  Respiratory Status: acceptable  Postoperative Hydration acceptable      Daphne Bernabe MD, PhD  5/6/2024 2:11 PM

## (undated) DEVICE — SOLUTION IRRIG 500ML BAL SALT 2 CHMBR GLS BSS

## (undated) DEVICE — SOLUTION IRRIG 1000ML ST H2O AQUALITE PLAS

## (undated) DEVICE — APPLICATOR,COTTON-TIP,WOOD,3,STRL: Brand: MEDLINE

## (undated) DEVICE — PACK SRG BIOM FULL DRP STRL

## (undated) DEVICE — 3M™ MICROFOAM™ SURGICAL TAPE, 2 INCHES X 5 YARDS, 6 ROLLS/CARTON, 6 CARTONS/CASE, 1528-2: Brand: 3M™ MICROFOAM™

## (undated) DEVICE — 60 ML SYRINGE LUER-LOCK TIP: Brand: MONOJECT

## (undated) DEVICE — CATHETER URETH 16FR RED RUB INTMIT ALL PURP

## (undated) DEVICE — 25+® FINESSE® FLEX LOOP DSP: Brand: ALCON GRIESHABER 25+ FINESSE

## (undated) DEVICE — SOLUTION IRRIG 250ML 0.9% NACL

## (undated) DEVICE — SYRINGE MED 50ML LL TIP DISP

## (undated) DEVICE — FILTER FLUID EYE E4429-22

## (undated) DEVICE — ENCORE® LATEX MICRO SIZE 6.5, STERILE LATEX POWDER-FREE SURGICAL GLOVE: Brand: ENCORE

## (undated) DEVICE — REVOLUTION DSP 25G ILM FORCEPS: Brand: ALCON GRIESHABER REVOLUTION

## (undated) DEVICE — SOLUTION PREP 30ML 5% POVIDONE IOD EYE BDINE

## (undated) DEVICE — PROBE OPHTH LSR FIX SHFT MOV FBR INVRT ST DIR

## (undated) DEVICE — Device: Brand: JELCO

## (undated) DEVICE — 25 GA ILLUMINATED FLEXIBLE CURVED LASER PROBE: Brand: ALCON, ENGAUGE

## (undated) DEVICE — EYE PAK* 1030 NON-WOVEN OPHTHALMIC DRAPE INCISE POUCHES: Brand: ALCON EYE-PAK

## (undated) DEVICE — RETINAL: Brand: MEDLINE INDUSTRIES, INC.

## (undated) DEVICE — MICROSURGICAL INSTRUMENT 20GA SOFT TIP NEEDLE: Brand: ALCON GREISHABER

## (undated) DEVICE — 25+®GA HYPERVIT®  BEVEL 20K CPM TOTAL PLUS® VITRECTOMY PAK: Brand: CONSTELLATION® HYPERVIT® TOTAL PLUS®

## (undated) DEVICE — CATHETER,URETHRAL,REDRUBBER,STRL,18FR: Brand: MEDLINE

## (undated) NOTE — LETTER
July 11, 2019    Juanita Foley MD  [address] [facility] [address]     Patient: Quentin Gaxiola   YOB: 1950   Date of Visit: 7/11/2019       Dear Dr. Nelda Lorenzo MD:    Thank you for referring Ioana Augustin to me PRAVASTATIN SODIUM 40 MG Oral Tab TAKE 1 TABLET EVERY NIGHT Disp: 90 tablet Rfl: 1   LEVOTHYROXINE SODIUM 200 MCG Oral Tab TAKE 1 TABLET BEFORE BREAKFAST Disp: 90 tablet Rfl: 1   TRIAMTERENE-HCTZ 37.5-25 MG Oral Cap TAKE 1 CAPSULE EVERY MORNING Disp: 90 ca Left -7.25 +1.25 060 20/20- +2.75 20/20          Final Rx       Sphere Cylinder [place] [facility] [place]    Right -6.25 +1.50 120 20/20 +2.75 20/20    Left -7.25 +1.25 060 20/20- +2.75 20/20    Type:  Progressive bifocal                 ASSESSMENT/PLAN:

## (undated) NOTE — LETTER
7/11/2019    Patient: Edna Current   MR Number: [id number]   YOB: 1950   Date of Visit: 7/11/2019   Physician: Susy Orosco MD     Dear Medicare Patient:  Luz Maria Aiden TO BENEFICIARY:  Please know that while a refraction is